# Patient Record
Sex: FEMALE | Race: WHITE | NOT HISPANIC OR LATINO | Employment: OTHER | URBAN - NONMETROPOLITAN AREA
[De-identification: names, ages, dates, MRNs, and addresses within clinical notes are randomized per-mention and may not be internally consistent; named-entity substitution may affect disease eponyms.]

---

## 2002-08-05 LAB — HIV 1+2 AB+HIV1 P24 AG SERPL QL IA: NEGATIVE

## 2017-02-16 ENCOUNTER — HISTORY (OUTPATIENT)
Dept: FAMILY MEDICINE | Facility: OTHER | Age: 62
End: 2017-02-16

## 2017-02-16 ENCOUNTER — OFFICE VISIT - GICH (OUTPATIENT)
Dept: FAMILY MEDICINE | Facility: OTHER | Age: 62
End: 2017-02-16

## 2017-02-16 DIAGNOSIS — J06.9 ACUTE UPPER RESPIRATORY INFECTION: ICD-10-CM

## 2017-03-15 ENCOUNTER — COMMUNICATION - GICH (OUTPATIENT)
Dept: FAMILY MEDICINE | Facility: OTHER | Age: 62
End: 2017-03-15

## 2017-03-15 DIAGNOSIS — J06.9 ACUTE UPPER RESPIRATORY INFECTION: ICD-10-CM

## 2017-05-01 ENCOUNTER — COMMUNICATION - GICH (OUTPATIENT)
Dept: FAMILY MEDICINE | Facility: OTHER | Age: 62
End: 2017-05-01

## 2017-05-01 DIAGNOSIS — G43.009 MIGRAINE WITHOUT AURA AND WITHOUT STATUS MIGRAINOSUS, NOT INTRACTABLE: ICD-10-CM

## 2017-05-15 ENCOUNTER — COMMUNICATION - GICH (OUTPATIENT)
Dept: FAMILY MEDICINE | Facility: OTHER | Age: 62
End: 2017-05-15

## 2017-05-15 DIAGNOSIS — J06.9 ACUTE UPPER RESPIRATORY INFECTION: ICD-10-CM

## 2017-07-10 ENCOUNTER — OFFICE VISIT - GICH (OUTPATIENT)
Dept: FAMILY MEDICINE | Facility: OTHER | Age: 62
End: 2017-07-10

## 2017-07-10 ENCOUNTER — HISTORY (OUTPATIENT)
Dept: FAMILY MEDICINE | Facility: OTHER | Age: 62
End: 2017-07-10

## 2017-07-10 DIAGNOSIS — N39.0 URINARY TRACT INFECTION: ICD-10-CM

## 2017-07-10 DIAGNOSIS — R82.998 OTHER ABNORMAL FINDINGS IN URINE: ICD-10-CM

## 2017-07-10 LAB
BACTERIA URINE: ABNORMAL BACTERIA/HPF
BILIRUB UR QL: ABNORMAL
CLARITY, URINE: CLEAR CLARITY
COLOR UR: YELLOW COLOR
EPITHELIAL CELLS: ABNORMAL EPI/HPF
GLUCOSE URINE: NEGATIVE MG/DL
KETONES UR QL: 15 MG/DL
LEUKOCYTE ESTERASE URINE: ABNORMAL
NITRITE UR QL STRIP: NEGATIVE
OCCULT BLOOD,URINE - HISTORICAL: ABNORMAL
PH UR: 5.5 [PH]
PROTEIN QUALITATIVE,URINE - HISTORICAL: NEGATIVE MG/DL
RBC - HISTORICAL: ABNORMAL /HPF
SP GR UR STRIP: 1.02
UROBILINOGEN,QUALITATIVE - HISTORICAL: NORMAL EU/DL
WBC - HISTORICAL: ABNORMAL /HPF

## 2017-07-10 ASSESSMENT — ANXIETY QUESTIONNAIRES
2. NOT BEING ABLE TO STOP OR CONTROL WORRYING: NEARLY EVERY DAY
GAD7 TOTAL SCORE: 20
1. FEELING NERVOUS, ANXIOUS, OR ON EDGE: NEARLY EVERY DAY
3. WORRYING TOO MUCH ABOUT DIFFERENT THINGS: NEARLY EVERY DAY
6. BECOMING EASILY ANNOYED OR IRRITABLE: MORE THAN HALF THE DAYS
4. TROUBLE RELAXING: NEARLY EVERY DAY
5. BEING SO RESTLESS THAT IT IS HARD TO SIT STILL: NEARLY EVERY DAY
7. FEELING AFRAID AS IF SOMETHING AWFUL MIGHT HAPPEN: NEARLY EVERY DAY

## 2017-07-10 ASSESSMENT — PATIENT HEALTH QUESTIONNAIRE - PHQ9: SUM OF ALL RESPONSES TO PHQ QUESTIONS 1-9: 18

## 2017-07-11 ENCOUNTER — HISTORY (OUTPATIENT)
Dept: EMERGENCY MEDICINE | Facility: OTHER | Age: 62
End: 2017-07-11

## 2017-07-12 LAB
CULTURE - HISTORICAL: ABNORMAL
CULTURE - HISTORICAL: ABNORMAL
SUSCEPTIBILITY RESULT - HISTORICAL: ABNORMAL

## 2017-07-25 ENCOUNTER — COMMUNICATION - GICH (OUTPATIENT)
Dept: FAMILY MEDICINE | Facility: OTHER | Age: 62
End: 2017-07-25

## 2017-07-27 ENCOUNTER — HISTORY (OUTPATIENT)
Dept: FAMILY MEDICINE | Facility: OTHER | Age: 62
End: 2017-07-27

## 2017-07-27 ENCOUNTER — OFFICE VISIT - GICH (OUTPATIENT)
Dept: FAMILY MEDICINE | Facility: OTHER | Age: 62
End: 2017-07-27

## 2017-07-27 DIAGNOSIS — R31.29 OTHER MICROSCOPIC HEMATURIA: ICD-10-CM

## 2017-07-27 DIAGNOSIS — N30.00 ACUTE CYSTITIS WITHOUT HEMATURIA: ICD-10-CM

## 2017-07-27 DIAGNOSIS — R30.0 DYSURIA: ICD-10-CM

## 2017-07-27 LAB
BACTERIA URINE: ABNORMAL BACTERIA/HPF
BILIRUB UR QL: ABNORMAL
CLARITY, URINE: CLEAR CLARITY
COLOR UR: YELLOW COLOR
EPITHELIAL CELLS: ABNORMAL EPI/HPF
GLUCOSE URINE: NEGATIVE MG/DL
KETONES UR QL: ABNORMAL MG/DL
LEUKOCYTE ESTERASE URINE: ABNORMAL
NITRITE UR QL STRIP: NEGATIVE
OCCULT BLOOD,URINE - HISTORICAL: NEGATIVE
OTHER: ABNORMAL
PH UR: 6.5 [PH]
PROTEIN QUALITATIVE,URINE - HISTORICAL: NEGATIVE MG/DL
RBC - HISTORICAL: ABNORMAL /HPF
SP GR UR STRIP: 1.01
UROBILINOGEN,QUALITATIVE - HISTORICAL: NORMAL EU/DL
WBC - HISTORICAL: ABNORMAL /HPF

## 2017-07-28 LAB — CULTURE - HISTORICAL: NORMAL

## 2017-08-08 ENCOUNTER — OFFICE VISIT (OUTPATIENT)
Dept: UROLOGY | Facility: OTHER | Age: 62
End: 2017-08-08
Attending: NURSE PRACTITIONER
Payer: MEDICARE

## 2017-08-08 VITALS
BODY MASS INDEX: 26.16 KG/M2 | HEIGHT: 65 IN | SYSTOLIC BLOOD PRESSURE: 110 MMHG | DIASTOLIC BLOOD PRESSURE: 60 MMHG | HEART RATE: 56 BPM | TEMPERATURE: 97.9 F | WEIGHT: 157 LBS

## 2017-08-08 DIAGNOSIS — R82.90 ABNORMAL URINE FINDINGS: ICD-10-CM

## 2017-08-08 DIAGNOSIS — R82.998 DARK URINE: ICD-10-CM

## 2017-08-08 DIAGNOSIS — N36.44 DETRUSOR SPHINCTER DYSSYNERGIA: Primary | ICD-10-CM

## 2017-08-08 DIAGNOSIS — R82.90 BAD ODOR OF URINE: ICD-10-CM

## 2017-08-08 LAB
ALBUMIN UR-MCNC: NEGATIVE MG/DL
APPEARANCE UR: ABNORMAL
BACTERIA #/AREA URNS HPF: ABNORMAL /HPF
BILIRUB UR QL STRIP: NEGATIVE
CAOX CRY #/AREA URNS HPF: ABNORMAL /HPF
COLOR UR AUTO: YELLOW
GLUCOSE UR STRIP-MCNC: NEGATIVE MG/DL
HGB UR QL STRIP: NEGATIVE
KETONES UR STRIP-MCNC: 10 MG/DL
LEUKOCYTE ESTERASE UR QL STRIP: ABNORMAL
MUCOUS THREADS #/AREA URNS LPF: PRESENT /LPF
NITRATE UR QL: NEGATIVE
PH UR STRIP: 6 PH (ref 4.7–8)
RBC #/AREA URNS AUTO: 4 /HPF (ref 0–2)
SP GR UR STRIP: 1.01 (ref 1–1.03)
URN SPEC COLLECT METH UR: ABNORMAL
UROBILINOGEN UR STRIP-MCNC: 2 MG/DL (ref 0–2)
WBC #/AREA URNS AUTO: 5 /HPF (ref 0–2)

## 2017-08-08 PROCEDURE — 84999 UNLISTED CHEMISTRY PROCEDURE: CPT | Mod: ZL | Performed by: NURSE PRACTITIONER

## 2017-08-08 PROCEDURE — 87109 MYCOPLASMA: CPT | Mod: ZL | Performed by: NURSE PRACTITIONER

## 2017-08-08 PROCEDURE — 99213 OFFICE O/P EST LOW 20 MIN: CPT | Performed by: NURSE PRACTITIONER

## 2017-08-08 PROCEDURE — 51798 US URINE CAPACITY MEASURE: CPT

## 2017-08-08 PROCEDURE — 87086 URINE CULTURE/COLONY COUNT: CPT | Mod: ZL | Performed by: NURSE PRACTITIONER

## 2017-08-08 PROCEDURE — 81001 URINALYSIS AUTO W/SCOPE: CPT | Mod: ZL | Performed by: NURSE PRACTITIONER

## 2017-08-08 PROCEDURE — 99213 OFFICE O/P EST LOW 20 MIN: CPT

## 2017-08-08 RX ORDER — DOXAZOSIN 1 MG/1
TABLET ORAL
Qty: 90 TABLET | Refills: 3 | Status: SHIPPED | OUTPATIENT
Start: 2017-08-08 | End: 2018-07-30

## 2017-08-08 ASSESSMENT — PAIN SCALES - GENERAL: PAINLEVEL: NO PAIN (0)

## 2017-08-08 NOTE — NURSING NOTE
"Chief Complaint   Patient presents with     UTI     Dark urine with odor for past        Initial /60 (BP Location: Right arm, Patient Position: Chair, Cuff Size: Adult Regular)  Pulse 56  Temp 97.9  F (36.6  C) (Tympanic)  Ht 1.638 m (5' 4.5\")  Wt 71.2 kg (157 lb)  BMI 26.53 kg/m2 Estimated body mass index is 26.53 kg/(m^2) as calculated from the following:    Height as of this encounter: 1.638 m (5' 4.5\").    Weight as of this encounter: 71.2 kg (157 lb).  Medication Reconciliation: complete   BEREKET ROSAS      "

## 2017-08-08 NOTE — MR AVS SNAPSHOT
"              After Visit Summary   8/8/2017    Demetra Chang    MRN: [id number]           Patient Information     Date Of Birth          1955        Visit Information        Provider Department      8/8/2017 2:00 PM Jessica Dey NP [facility]        Today's Diagnoses     Detrusor sphincter dyssynergia    -  1    Bad odor of urine        Dark urine          Care Instructions    I am sending your urine for several tests, a urinalysis, and for ureaplasma and mycoplasma.  We want to make sure we are testing for everything.  You may cancel your appt for next month and see me again in a year.  I reordered your Cardura.            Follow-ups after your visit        Your next 10 appointments already scheduled     Aug 07, 2018  1:00 PM CDT   (Arrive by 12:45 PM)   Return Visit with Jessica Dey NP   [facility] ([facility] )    [address]  [facility] [address]   [phone]              Who to contact     If you have questions or need follow up information about today's clinic visit or your schedule please contact [facility] directly at [phone].  Normal or non-critical lab and imaging results will be communicated to you by MyChart, letter or phone within 4 business days after the clinic has received the results. If you do not hear from us within 7 days, please contact the clinic through MyChart or phone. If you have a critical or abnormal lab result, we will notify you by phone as soon as possible.  Submit refill requests through [employer] or call your pharmacy and they will forward the refill request to us. Please allow 3 business days for your refill to be completed.          Additional Information About Your Visit        MyChart Information     [employer] lets you send messages to your doctor, view your test results, renew your prescriptions, schedule appointments and more. To sign up, go to [email or web address] . Click on \"Log in\" on the " "left side of the screen, which will take you to the Welcome page. Then click on \"Sign up Now\" on the right side of the page.     You will be asked to enter the access code listed below, as well as some personal information. Please follow the directions to create your username and password.     Your access code is: [email or web address]  Expires: 2017  2:51 PM     Your access code will  in 90 days. If you need help or a new code, please call your [facility] clinic or [phone].        Care EveryWhere ID     This is your Care EveryWhere ID. This could be used by other organizations to access your [facility] medical records  [id number]        Your Vitals Were     Pulse Temperature Height BMI (Body Mass Index)          56 97.9  F (36.6  C) (Tympanic) 1.638 m (5' 4.5\") 26.53 kg/m2         Blood Pressure from Last 3 Encounters:   17 110/60   16 112/64   09/01/15 100/62    Weight from Last 3 Encounters:   17 71.2 kg (157 lb)   16 81.6 kg (180 lb)   09/01/15 77.1 kg (170 lb)              We Performed the Following     Mycoplasma large colony culture     UA reflex to Microscopic and Culture     Ureaplasma culture          Where to get your medicines      These medications were sent to [id number] - [place], [place] - [address]  [address], [facility] [address]     Phone:  [phone]     doxazosin 1 MG tablet          Primary Care Provider Office Phone # Fax #    Ashley Levy [phone]       [facility] [address]  [place] [address]        Equal Access to Services     FARAZ PLASENCIA : Herbert Krueger, antonia prasad, jeny waller. So [facility] [phone].    ATENCIÓN: Si habla español, tiene a handy disposición servicios gratuitos de asistencia lingüística. Llame al [phone].    We comply with applicable federal civil rights laws and [place] laws. " We do not discriminate on the basis of race, color, national origin, age, disability sex, sexual orientation or gender identity.            Thank you!     Thank you for choosing [facility]  for your care. Our goal is always to provide you with excellent care. Hearing back from our patients is one way we can continue to improve our services. Please take a few minutes to complete the written survey that you may receive in the mail after your visit with us. Thank you!             Your Updated Medication List - Protect others around you: Learn how to safely use, store and throw away your medicines at www.disposemymeds.org.          This list is accurate as of: 8/8/17  2:51 PM.  Always use your most recent med list.                   Brand Name Dispense Instructions for use Diagnosis    ABILIFY 15 MG tablet   Generic drug:  ARIPiprazole      Take 15 mg by mouth daily        ADDERALL XR PO      Take 30 mg by mouth 2 times daily        aspirin 81 MG tablet      Take 81 mg by mouth daily        CALCIUM 600 PO      Take 500 mg by mouth 2 times daily        cyanocobalamin 1000 MCG tablet    vitamin  B-12     Take 1,000 mcg by mouth daily        doxazosin 1 MG tablet    CARDURA    90 tablet    TAKE 1 TABLET BY MOUTH EVERY NIGHT AT BEDTIME.    Detrusor sphincter dyssynergia       EFFEXOR XR PO      Take 75 mg by mouth 3 times daily        FLUoxetine 40 MG capsule    PROzac     Take 40 mg by mouth 2 times daily        klonoPIN 1 MG tablet   Generic drug:  clonazePAM      Take 0.5 mg by mouth nightly as needed        MULTI-VITAMIN PO      Take by mouth daily        TAPAZOLE 5 MG tablet   Generic drug:  methimazole      Take 5 mg by mouth daily        TOPAMAX 100 MG tablet   Generic drug:  topiramate      Take 100 mg by mouth 3 times daily        VITAMIN D PO      Take 2,000 Int'l Units by mouth daily

## 2017-08-08 NOTE — PATIENT INSTRUCTIONS
I am sending your urine for several tests, a urinalysis, and for ureaplasma and mycoplasma.  We want to make sure we are testing for everything.  You may cancel your appt for next month and see me again in a year.  I reordered your Cardura.

## 2017-08-08 NOTE — PROGRESS NOTES
CC: Follow up for DSD and feelings of a urinary tract infection.    HPI: Ms. Demetra Chang is a very pleasant 59 year old female seen today in the urology clinic for follow up regarding detrusor sphincter dyssynergia (DSD).  She also feels like she has a urinary tract infection. The symptoms she is experiencing includes a bad odor and very dark urine. This is abnormal for her because she drinks a lot of water during the day.   She tells me that she has seen Dr. Ashley in [place] twice in the past few months and has been treated for a urinary tract infection.  She was given nitrofurantoin once and Bactrim once.   She has been on Cardura for several years for this DSD.  This has been working well for her.  (Her original symptoms were an inability to void)  She is now taking her Cardura in the morning and no longer has any issues with leaking or not making it to the   She denies issues with  hematuria, dysuria, frequency, urgency or nocturia.     Review Of Systems  Skin: negative  Eyes: negative  Ears/Nose/Throat: negative  Respiratory: No shortness of breath, dyspnea on exertion, cough, or hemoptysis  Cardiovascular: negative  Gastrointestinal: negative  Genitourinary: negative  Musculoskeletal: has had some issues with muscle pain recently  Neurologic: negative  Psychiatric: depression stable  Hematologic/Lymphatic/Immunologic: negative  Endocrine: negative    No past medical history on file.    Past Surgical History:   Procedure Laterality Date     ORIF ankle  1999    RT      SINUS SURGERY       SURGICAL PATHOLOGY EXAM      polyp removal       Current Outpatient Prescriptions   Medication     aspirin 81 MG tablet     cyanocobalamin (VITAMIN  B-12) 1000 MCG tablet     doxazosin (CARDURA) 1 MG tablet     Venlafaxine HCl (EFFEXOR XR PO)     FLUoxetine (PROZAC) 40 MG capsule     Amphetamine-Dextroamphetamine (ADDERALL XR PO)     topiramate (TOPAMAX) 100 MG tablet     ARIPiprazole (ABILIFY) 15 MG tablet      "clonazePAM (KLONOPIN) 1 MG tablet     methimazole (TAPAZOLE) 5 MG tablet     Multiple Vitamin (MULTI-VITAMIN PO)     Calcium Carbonate (CALCIUM 600 PO)     Cholecalciferol (VITAMIN D PO)     No current facility-administered medications for this visit.        No Known Allergies    Social History     Social History     Marital status:      Spouse name: N/A     Number of children: N/A     Years of education: N/A     Occupational History     Not on file.     Social History Main Topics     Smoking status: Never Smoker     Smokeless tobacco: Never Used     Alcohol use No     Drug use: No     Sexual activity: Not on file     Other Topics Concern     Parent/Sibling W/ Cabg, Mi Or Angioplasty Before 65f 55m? No     Social History Narrative       Family History   Problem Relation Age of Onset     CANCER Mother      lung (cause of death)      Prostate Cancer Father      (cause of death)      PHYSICAL EXAM:   /60 (BP Location: Right arm, Patient Position: Chair, Cuff Size: Adult Regular)  Pulse 56  Temp 97.9  F (36.6  C) (Tympanic)  Ht 1.638 m (5' 4.5\")  Wt 71.2 kg (157 lb)  BMI 26.53 kg/m2  GENERAL: Well groomed, well developed, well nourished female in no acute distress.  RESP: No increased respiratory effort. Lungs clear to auscultation bilateral.  CV: RRR, no murmurs/rubs/gallops.  LYMPH: No LE edema.  ABD: Soft, non tender, non distended, no palpable masses.  Normoactive bowel sounds.  No CVA tenderness bilaterally.  MS: Full ROM in extremities.  NEURO: Alert and oriented x 3.  PSYCH: Normal mood and affect, pleasant and agreeable during interview and exam.    PVR: Postvoid residual urine volume as measured by ultrasound was 48 ml.      ASSESSMENT/PLAN:  Ms. Demetra Chang is a very pleasant 59 year old female with a history of DSD and now feels like she has a urinary tract infection.  She is currently doing well with her Cardura for her DSD and will continue to take it and see me in a year.  We will get " her records from Dr. Ashley regarding her urine samples.  I will run a repeat urinalysis today, as well as a ureaplasma and mycoplasma test.  She may need to return sooner for this issue if her symptoms do not improve.    I have enjoyed participating in the medical care of this very pleasant patient.  Using layterms, and diagrams when needed, I explained the pathophysiology, usual course, potential complications, recommended monitoring, preventive modalities, treatment rationale, risks, and benefits to Ms. Demetra Chang. The patient appeared to understand and all questions were satisfactorily answered.      Jessica Dey CNP, [facility]  Urology and Wound Care  August 8, 2017

## 2017-08-10 LAB
BACTERIA SPEC CULT: ABNORMAL
BACTERIA SPEC CULT: NORMAL
BACTERIA SPEC CULT: NORMAL
MICRO REPORT STATUS: ABNORMAL
MICRO REPORT STATUS: NORMAL
MICRO REPORT STATUS: NORMAL
SPECIMEN SOURCE: ABNORMAL
SPECIMEN SOURCE: NORMAL
SPECIMEN SOURCE: NORMAL

## 2017-08-15 LAB
RESULT: NORMAL
SEND OUTS MISC TEST CODE: NORMAL
SEND OUTS MISC TEST SPECIMEN: NORMAL
TEST NAME: NORMAL

## 2017-12-27 NOTE — PROGRESS NOTES
Patient Information     Patient Name MRN Demetra Carpenter [id number] Female 1955      Progress Notes by Barron Ashley MD at 2017 10:30 AM     Author:  Barron Ashley MD Service:  (none) Author Type:  Physician     Filed:  2017 11:27 AM Encounter Date:  2017 Status:  Signed     :  Barron Ashley MD (Physician)            SUBJECTIVE:  Demetra Chang is a 61 y.o. female who presents for follow-up of a UTI. She was seen on July 10 with complaints of dark-appearing odiferous urine. Urine culture grew Escherichia coli which was pansensitive and she completed a seven-day course of Macrodantin. She didn't really notice much difference with antibody treatment. Her urine still has an odor and appears dark yellow in color. She denies urinary frequency or dysuria.    No Known Allergies and   Current Outpatient Prescriptions on File Prior to Visit       Medication  Sig Dispense Refill     ARIPiprazole (ABILIFY) 10 mg tablet Take 10 mg by mouth.       aspirin (ECOTRIN) 81 mg enteric coated tablet Take 1 tablet by mouth once daily with a meal.       cholecalciferol (VITAMIN D) 1,000 unit capsule Take 1,000 Units by mouth once daily.       clonazePAM (KLONOPIN) 0.5 mg tablet Take 1 tablet by mouth at bedtime. 60 tablet 3     doxazosin (CARDURA) 1 mg tablet Take 1 mg by mouth once daily.       FLUoxetine (PROZAC) 40 mg capsule TAKE 1 CAPSULE BY MOUTH TWICE DAILY. 62 capsule 10     methimazole (TAPAZOLE) 5 mg tablet Take 5 mg by mouth once daily.       multivitamin (MVI) tablet Take 1 tablet by mouth once daily.       topiramate (TOPAMAX) 100 mg tablet Take 1 tablet by mouth 3 times daily. 270 tablet 0     venlafaxine (EFFEXOR XR) 75 mg cp24 Extended-Release capsule TAKE 3 CAPSULES BY MOUTH ONCE DAILY WITH A MEAL 270 capsule 3     No current facility-administered medications on file prior to visit.        OBJECTIVE:  /72  Pulse 88  Temp 98  F (36.7  C) (Temporal)  Ht 1.638 m  "(5' 4.5\")  Wt 73 kg (161 lb)  LMP 12/19/2016  BMI 27.21 kg/m2  EXAM:  General Appearance: Pleasant, alert, appropriate appearance for age. No acute distress.    Results for orders placed or performed in visit on 07/27/17      URINALYSIS W REFLEX MICROSCOPIC IF POSITIVE      Result  Value Ref Range    COLOR                     Yellow Yellow Color    CLARITY                   Clear Clear Clarity    SPECIFIC GRAVITY,URINE    1.015 1.010, 1.015, 1.020, 1.025                    PH,URINE                  6.5 6.0, 7.0, 8.0, 5.5, 6.5, 7.5, 8.5                    UROBILINOGEN,QUALITATIVE  Normal Normal EU/dl    PROTEIN, URINE Negative Negative mg/dL    GLUCOSE, URINE Negative Negative mg/dL    KETONES,URINE             Trace (A) Negative mg/dL    BILIRUBIN,URINE           Abnormal (A) Negative                    OCCULT BLOOD,URINE        Negative Negative                    NITRITE                   Negative Negative                    LEUKOCYTE ESTERASE        Moderate (A) Negative                   URINALYSIS MICROSCOPIC      Result  Value Ref Range    RBC 11-25 (A) 0-2, None Seen /HPF    WBC None Seen 0-2, 3-5, None Seen /HPF    BACTERIA                  Few None Seen, Rare, Occasional, Few Bacteria/HPF    EPITHELIAL CELLS          None Seen None Seen, Few Epi/HPF    OTHER Mucus Present        ASSESSMENT/Plan :      ICD-10-CM    1. Acute cystitis without hematuria  Elected to treat with a seven-day course of Bactrim DS pending urine culture results. Will repeat a urine culture in 2 weeks to document clearing of infection.  N30.00 trimethoprim-sulfamethoxazole, 160-800 mg, (BACTRIM DS, SEPTRA DS) tablet      URINALYSIS W REFLEX MICROSCOPIC IF POSITIVE      URINE CULTURE   2. Microscopic hematuria  Possibly related to #1 above. Will repeat a urinalysis 2 weeks from now after she has completed Bactrim DS.  R31.29 URINALYSIS W REFLEX MICROSCOPIC IF POSITIVE      URINE CULTURE   3. Dysuria R30.0 URINALYSIS W REFLEX " MICROSCOPIC IF POSITIVE      URINALYSIS W REFLEX MICROSCOPIC IF POSITIVE      URINALYSIS MICROSCOPIC      URINALYSIS MICROSCOPIC      URINE CULTURE      URINE CULTURE     Patient Instructions   Schedule a lab only appointment in two weeks for a repeat urinalysis and culture.       Barron Ashley MD

## 2017-12-28 NOTE — PATIENT INSTRUCTIONS
Patient Information     Patient Name MRN Demetra Carpenter [id number] Female 1955      Patient Instructions by Barron Ashley MD at 2017 10:30 AM     Author:  Barron Ashley MD Service:  (none) Author Type:  Physician     Filed:  2017 11:11 AM Encounter Date:  2017 Status:  Signed     :  Barron Ashley MD (Physician)            Schedule a lab only appointment in two weeks for a repeat urinalysis and culture.

## 2017-12-28 NOTE — PROGRESS NOTES
"Patient Information     Patient Name MRN Demetra Carpenter [id number] Female 1955      Progress Notes by Barron Ashley MD at 7/10/2017 11:00 AM     Author:  Barron Ashley MD Service:  (none) Author Type:  Physician     Filed:  7/10/2017 11:58 AM Encounter Date:  7/10/2017 Status:  Signed     :  Barron Ashley MD (Physician)            SUBJECTIVE:  Demetra Chang is a 61 y.o. female who presents concerned about a possible bladder infection. Over the past month or so has noted that her urine seems darker than usual and has a strong odor. She denies urinary frequency or dysuria. No new foods although has been trying to follow a reduced carbohydrate diet recently.    No Known Allergies and   Current Outpatient Prescriptions on File Prior to Visit       Medication  Sig Dispense Refill     Amphetamine-Dextroamphetamine (ADDERALL) 30 mg tablet Take 1 tablet by mouth 2 times daily  Prescribed by Harika       aspirin (ECOTRIN) 81 mg enteric coated tablet Take 1 tablet by mouth once daily with a meal.       cholecalciferol (VITAMIN D) 1,000 unit capsule Take 1,000 Units by mouth once daily.       clonazePAM (KLONOPIN) 0.5 mg tablet Take 1 tablet by mouth at bedtime. 60 tablet 3     doxazosin (CARDURA) 1 mg tablet Take 1 mg by mouth once daily.       FLUoxetine (PROZAC) 40 mg capsule TAKE 1 CAPSULE BY MOUTH TWICE DAILY. 62 capsule 10     methimazole (TAPAZOLE) 5 mg tablet Take 5 mg by mouth once daily.       multivitamin (MVI) tablet Take 1 tablet by mouth once daily.       topiramate (TOPAMAX) 100 mg tablet Take 1 tablet by mouth 3 times daily. 270 tablet 0     venlafaxine (EFFEXOR XR) 75 mg cp24 Extended-Release capsule TAKE 3 CAPSULES BY MOUTH ONCE DAILY WITH A MEAL 270 capsule 3     No current facility-administered medications on file prior to visit.        OBJECTIVE:  /74  Temp 97.1  F (36.2  C) (Temporal)  Ht 1.638 m (5' 4.5\")  Wt 75.8 kg (167 lb)  LMP 2016  BMI 28.22 " kg/m2  EXAM:  General Appearance: Pleasant, alert, appropriate appearance for age. No acute distress  Gastrointestinal Exam: Soft, nontender, no abnormal masses or organomegaly.  Genitourinary Exam Female: No CVA tenderness.    Results for orders placed or performed in visit on 07/10/17      URINALYSIS W REFLEX MICROSCOPIC IF POSITIVE      Result  Value Ref Range    COLOR                     Yellow Yellow Color    CLARITY                   Clear Clear Clarity    SPECIFIC GRAVITY,URINE    1.025 1.010, 1.015, 1.020, 1.025                    PH,URINE                  5.5 6.0, 7.0, 8.0, 5.5, 6.5, 7.5, 8.5                    UROBILINOGEN,QUALITATIVE  Normal Normal EU/dl    PROTEIN, URINE Negative Negative mg/dL    GLUCOSE, URINE Negative Negative mg/dL    KETONES,URINE             15 (A) Negative mg/dL    BILIRUBIN,URINE           Abnormal (A) Negative                    OCCULT BLOOD,URINE        Trace (A) Negative                    NITRITE                   Negative Negative                    LEUKOCYTE ESTERASE        Large (A) Negative                   URINALYSIS MICROSCOPIC      Result  Value Ref Range    RBC None Seen 0-2, None Seen /HPF    WBC  (A) 0-2, 3-5, None Seen /HPF    BACTERIA                  Many (A) None Seen, Rare, Occasional, Few Bacteria/HPF    EPITHELIAL CELLS          None Seen None Seen, Few Epi/HPF       ASSESSMENT/Plan :      ICD-10-CM    1. Urinary tract infection, site unspecified  Elected to treat with a seven-day course of Macrobid pending urine culture results. She will continue to push fluids and follow up if symptoms do not resolve.  N39.0 URINE CULTURE      nitrofurantoin macrocrystals/monohydrate (MACROBID) 100 mg capsule      URINE CULTURE   2. Dark urine R82.99 URINALYSIS W REFLEX MICROSCOPIC IF POSITIVE      URINALYSIS W REFLEX MICROSCOPIC IF POSITIVE      URINALYSIS MICROSCOPIC      URINALYSIS MICROSCOPIC       Barron Ashley MD

## 2017-12-30 NOTE — NURSING NOTE
Patient Information     Patient Name MRN Sex Demetra Currie [id number] Female 1955      Nursing Note by Gosselin, Norma J at 2017 10:30 AM     Author:  Gosselin, Norma J Service:  (none) Author Type:  (none)     Filed:  2017 10:52 AM Encounter Date:  2017 Status:  Signed     :  Gosselin, Norma J            Patient presents to clinic for follow up UTI.  Norma J Gosselin LPN....................  2017   10:45 AM

## 2018-01-03 NOTE — TELEPHONE ENCOUNTER
Patient Information     Patient Name MRN Sex Demetra Currie [id number] Female 1955      Telephone Encounter by Justin Lai RN at 3/15/2017  3:11 PM     Author:  Justin Lai RN Service:  (none) Author Type:  NURS- Registered Nurse     Filed:  3/15/2017  3:16 PM Encounter Date:  3/15/2017 Status:  Signed     :  Justin Lai RN (NURS- Registered Nurse)            This is a Refill request from: [facility]  Name of Medication: Flonase  Quantity requested: 1 bottle  Last fill date: 17  Due for refill: Unknown  Last visit with Miles Alvarez MD was on: 16 in [facility]ated [facility]  PCP:  Miles Alvarez MD  Controlled Substance Agreement:  N/A   Diagnosis r/t this medication request: URI    Chart review shows that requested rx was ordered on 17 by Dr. Gibbons. Unknown if patient is to continue utilizing as was ordered for an acute issue of an URI. Will pend rx request to PCP for his consideration.     Unable to complete prescription refill per RN Medication Refill Policy.................... Justin Lai RN ....................  3/15/2017   3:13 PM

## 2018-01-03 NOTE — PROGRESS NOTES
Patient Information     Patient Name MRN Sex Demetra Currie [id number] Female 1955      Progress Notes by Mya Gibbons MD at 2017  2:00 PM     Author:  Mya Gibbons MD Service:  (none) Author Type:  Physician     Filed:  2017  4:28 PM Encounter Date:  2017 Status:  Signed     :  Mya Gibbons MD (Physician)            Subjective    Demetra Chang is a 61 y.o. female who complains of a cough.  Duration: 3 weeks  Severity: mild  Modifiers: Rx meds helpful  Trend of symptoms: steady for several days  Fever: none  Cough is dry  Other symptoms include: sore throat  History of: no significant respiratory history  History of same illness: no prior history of similar illness  Ill contacts: no contacts known with similar symptoms      Objective    Visit Vitals       /78     Pulse 98     Temp 97.7  F (36.5  C) (Oral)     Wt 88.4 kg (194 lb 12.8 oz)     LMP 2016     BMI 32.82 kg/m2     General appearance: healthy, alert and cooperative.    Left Ear: normal, normal; external ear canal and TM clear, nontender.  Right Ear: normal; external ear canal and TM clear, nontender.  Nose: normal mucosa  Sinuses: normal; sinuses nontender  Oropharynx: normal pharynx and buccal mucosa. Dental hygeine adequate.  Neck: normal; supple with no masses or nodes.  Lungs: normal chest wall and respirations; clear to auscultation.  Heart: normal; regular rate and rhythm.  S1, S2, no murmur, click, gallop, or rubs.        Assessment      ICD-10-CM    1. Upper respiratory tract infection, unspecified type J06.9 fluticasone (50 mcg per actuation) nasal solution (FLONASE)      guaiFENesin (MUCINEX) 600 mg Extended-Release tablet           Plan    Meds as per orders.  Symptomatic therapy suggested: use increased fluids and rest and acetaminophen prn.  Call or return to clinic prn if these symptoms worsen or fail to improve as anticipated.  No indication for antibiotics,    Mya Rico MD  4:28 PM 2/17/2017

## 2018-01-03 NOTE — NURSING NOTE
Patient Information     Patient Name MRN Demetra Carpenter [id number] Female 1955      Nursing Note by Lala Galeas at 2017  2:00 PM     Author:  Lala Galeas Service:  (none) Author Type:  (none)     Filed:  2017  2:52 PM Encounter Date:  2017 Status:  Signed     :  Lala Galeas            Patient is here for cough and not feeling well since November, states has been seen x 2. Having nasal congestion, headache, cough, some chest congestion, chills, no energy and very tired all the time.  Lala Galeas LPN .............2017  2:25 PM

## 2018-01-04 NOTE — TELEPHONE ENCOUNTER
Patient Information     Patient Name MRN Demetra Carpenter [id number] Female 1955      Telephone Encounter by Lorna Wakefield RN at 2017  1:43 PM     Author:  Lorna Wakefield RN Service:  (none) Author Type:  NURS- Registered Nurse     Filed:  2017  1:46 PM Encounter Date:  2017 Status:  Signed     :  Lorna Wakefield RN (NURS- Registered Nurse)            Triptans-Serotonin Receptor Agonist    Office visit in the past 12 months or per provider note.    Last visit with PRABHJOT VELAZCO was on: 2016 in [facility]  Next visit with PRABHJOT VELAZCO is on: No future appointment listed with this provider  Next visit with Family Practice is on: No future appointment listed in this department    Max refill for 12 months from last office visit or per provider note.    Patient is due for medication management appointment. Limited refill provided at this time and letter sent for reminder to patient. Prescription refilled per RN Medication Refill Policy.................... Lorna Wakefield RN ....................  2017   1:45 PM

## 2018-01-05 NOTE — TELEPHONE ENCOUNTER
Patient Information     Patient Name MRN Demetra Carpenter [id number] Female 1955      Telephone Encounter by Lorna Wakefield RN at 5/15/2017  2:59 PM     Author:  Lorna Wakefield RN Service:  (none) Author Type:  NURS- Registered Nurse     Filed:  5/15/2017  3:03 PM Encounter Date:  5/15/2017 Status:  Signed     :  Lorna Wakefield RN (NURS- Registered Nurse)             Nasal Steroids    Office visit in the past 12 months.    Last visit with PRABHJOT VELAZCO was on: 2016 in [facility]  Next visit with PRABHJOT VELAZCO is on: No future appointment listed with this provider  Next visit with Family Practice is on: No future appointment listed in this department    Max refills 12 months from last office visit.    Patient is due for medication management appointment. Limited refill provided at this time. [employer] message and/or letter sent for reminder to patient. Prescription refilled per RN Medication Refill Policy.................... Lorna Wakefield RN ....................  5/15/2017   3:00 PM

## 2018-01-25 VITALS
TEMPERATURE: 97.7 F | HEART RATE: 98 BPM | SYSTOLIC BLOOD PRESSURE: 120 MMHG | DIASTOLIC BLOOD PRESSURE: 78 MMHG | BODY MASS INDEX: 32.82 KG/M2 | WEIGHT: 194.8 LBS

## 2018-01-25 VITALS
TEMPERATURE: 97.1 F | BODY MASS INDEX: 27.82 KG/M2 | WEIGHT: 167 LBS | HEIGHT: 65 IN | DIASTOLIC BLOOD PRESSURE: 74 MMHG | SYSTOLIC BLOOD PRESSURE: 126 MMHG

## 2018-01-25 VITALS
WEIGHT: 161 LBS | BODY MASS INDEX: 26.82 KG/M2 | SYSTOLIC BLOOD PRESSURE: 112 MMHG | DIASTOLIC BLOOD PRESSURE: 72 MMHG | HEIGHT: 65 IN | TEMPERATURE: 98 F | HEART RATE: 88 BPM

## 2018-01-27 ASSESSMENT — PATIENT HEALTH QUESTIONNAIRE - PHQ9: SUM OF ALL RESPONSES TO PHQ QUESTIONS 1-9: 18

## 2018-01-27 ASSESSMENT — ANXIETY QUESTIONNAIRES: GAD7 TOTAL SCORE: 20

## 2018-03-09 ENCOUNTER — DOCUMENTATION ONLY (OUTPATIENT)
Dept: FAMILY MEDICINE | Facility: OTHER | Age: 63
End: 2018-03-09

## 2018-03-09 PROBLEM — Z86.59 PERSONAL HISTORY OF OTHER MENTAL DISORDER: Status: ACTIVE | Noted: 2018-03-09

## 2018-03-09 RX ORDER — DEXTROAMPHETAMINE SACCHARATE, AMPHETAMINE ASPARTATE MONOHYDRATE, DEXTROAMPHETAMINE SULFATE AND AMPHETAMINE SULFATE 7.5; 7.5; 7.5; 7.5 MG/1; MG/1; MG/1; MG/1
1 CAPSULE, EXTENDED RELEASE ORAL 2 TIMES DAILY
COMMUNITY
Start: 2017-07-24 | End: 2024-05-02 | Stop reason: DRUGHIGH

## 2018-03-09 RX ORDER — ASPIRIN 81 MG/1
81 TABLET ORAL
COMMUNITY
Start: 2015-04-20 | End: 2018-11-13

## 2018-03-09 RX ORDER — ARIPIPRAZOLE 10 MG/1
10 TABLET ORAL
COMMUNITY
Start: 2016-10-05

## 2018-03-09 RX ORDER — VENLAFAXINE HYDROCHLORIDE 75 MG/1
225 CAPSULE, EXTENDED RELEASE ORAL
COMMUNITY
Start: 2016-06-17 | End: 2018-11-13

## 2018-03-09 RX ORDER — DIPHENOXYLATE HYDROCHLORIDE AND ATROPINE SULFATE 2.5; .025 MG/1; MG/1
1 TABLET ORAL DAILY
COMMUNITY
End: 2018-11-13

## 2018-03-09 RX ORDER — DOXAZOSIN 1 MG/1
1 TABLET ORAL DAILY
COMMUNITY
End: 2018-11-13

## 2018-03-09 RX ORDER — CLONAZEPAM 1 MG/1
0.5 TABLET ORAL
COMMUNITY
End: 2018-11-13

## 2018-03-09 RX ORDER — CLONAZEPAM 0.5 MG/1
0.5 TABLET ORAL AT BEDTIME
COMMUNITY
Start: 2016-12-19 | End: 2018-11-13

## 2018-03-09 RX ORDER — TOPIRAMATE 100 MG/1
100 TABLET, FILM COATED ORAL 3 TIMES DAILY
COMMUNITY
Start: 2017-05-01 | End: 2018-11-13

## 2018-03-09 RX ORDER — FLUOXETINE 40 MG/1
CAPSULE ORAL
COMMUNITY
Start: 2016-03-25 | End: 2018-11-13

## 2018-03-09 RX ORDER — METHIMAZOLE 5 MG/1
5 TABLET ORAL DAILY
COMMUNITY
End: 2018-11-13

## 2018-03-09 RX ORDER — SULFAMETHOXAZOLE/TRIMETHOPRIM 800-160 MG
1 TABLET ORAL 2 TIMES DAILY
COMMUNITY
Start: 2017-07-27 | End: 2018-11-13

## 2018-07-06 ENCOUNTER — TRANSFERRED RECORDS (OUTPATIENT)
Dept: HEALTH INFORMATION MANAGEMENT | Facility: HOSPITAL | Age: 63
End: 2018-07-06

## 2018-07-06 LAB
HEP C HIM: NORMAL
HPV ABSTRACT: NORMAL
PAP-ABSTRACT: NORMAL

## 2018-07-23 NOTE — PROGRESS NOTES
Patient Information     Patient Name  Demetra Chang MRN  [id number] Sex  Female   1955      Letter by Miles Alvarez MD at      Author:  Miles Alvarez MD Service:  (none) Author Type:  (none)    Filed:   Encounter Date:  5/15/2017 Status:  (Other)           Demetra Chang  [address]  [address] [place] [id number]          May 15, 2017    Dear Ms. Chang:    This letter is to remind you that you are due for your annual exam with Miles Alvarez MD. Your last comprehensive visit was more than 12 months ago.    A LIMITED refill of fluticasone (50 mcg per actuation) nasal solution (FLONASE) has been called into your pharmacy. Additional refills require you to complete this appointment.    Please call the clinic at [phone] to schedule your appointment.    If you should require additional refills before your scheduled appointment, please contact your pharmacy and we will refill your medication until the date of your appointment.    Thank you for choosing [facility] and [facility] for your health care needs.    Sincerely,    Refill RN  [facility]

## 2018-07-23 NOTE — PROGRESS NOTES
Patient Information     Patient Name  Demetra Chang MRN  [id number] Sex  Female   1955      Letter by Barron Ashley MD at      Author:  Barron Ashley MD Service:  (none) Author Type:  (none)    Filed:   Encounter Date:  2017 Status:  (Other)       Demetra Chang  [address]  [address] [place] [id number]    2017      Dear Ms. Chang,      Please find enclosed a copy of your recent laboratory studies.    Your recent urine culture showed no evidence of ongoing infection.      Results for orders placed or performed in visit on 17       URINALYSIS W REFLEX MICROSCOPIC IF POSITIVE       Result   Value Ref Range    COLOR                      Yellow Yellow Color    CLARITY                    Clear Clear Clarity    SPECIFIC GRAVITY,URINE     1.015 1.010, 1.015, 1.020, 1.025                    PH,URINE                   6.5 6.0, 7.0, 8.0, 5.5, 6.5, 7.5, 8.5                    UROBILINOGEN,QUALITATIVE   Normal Normal EU/dl    PROTEIN, URINE  Negative Negative mg/dL    GLUCOSE, URINE  Negative Negative mg/dL    KETONES,URINE              Trace (A) Negative mg/dL    BILIRUBIN,URINE            Abnormal (A) Negative                    OCCULT BLOOD,URINE         Negative Negative                    NITRITE                    Negative Negative                    LEUKOCYTE ESTERASE         Moderate (A) Negative                   URINALYSIS MICROSCOPIC       Result   Value Ref Range    RBC  11-25 (A) 0-2, None Seen /HPF    WBC  None Seen 0-2, 3-5, None Seen /HPF    BACTERIA                   Few None Seen, Rare, Occasional, Few Bacteria/HPF    EPITHELIAL CELLS           None Seen None Seen, Few Epi/HPF    OTHER  Mucus Present    URINE CULTURE       Result   Value Ref Range    CULTURE        10-50,000 CFU/mL of multiple organisms, probable contaminants         It was a pleasure seeing you recently in the clinic.  If you have any questions regarding these results, please feel free to  call.      Sincerely,       Barron Ashley MD

## 2018-07-24 NOTE — PROGRESS NOTES
Patient Information     Patient Name  Demetra Chang MRN  [id number] Sex  Female   1955      Letter by Miles Alvarez MD at      Author:  Miles Alvarez MD Service:  (none) Author Type:  (none)    Filed:   Encounter Date:  2017 Status:  (Other)           Demetra Chang  [address]  [address] [place] [id number]          May 1, 2017    Dear Ms. Chang:    This letter is to remind you that you are due for your annual exam with Miles Alvarez MD. Your last comprehensive visit was more than 12 months ago.    A LIMITED refill of topiramate (TOPAMAX) 100 mg tablet has been called into your pharmacy. Additional refills require you to complete this appointment.    Please call the clinic at [phone] to schedule your appointment.    Thank you for choosing [facility] and [facility] for your health care needs.    Sincerely,    Refill RN  [facility]

## 2018-07-30 DIAGNOSIS — N36.44 DETRUSOR SPHINCTER DYSSYNERGIA: ICD-10-CM

## 2018-07-31 RX ORDER — DOXAZOSIN 1 MG/1
TABLET ORAL
Qty: 90 TABLET | Refills: 0 | Status: SHIPPED | OUTPATIENT
Start: 2018-07-31 | End: 2018-10-26

## 2018-07-31 NOTE — TELEPHONE ENCOUNTER
Cardura  Last Written Prescription Date:  8/8/17  Last Fill Qty:  90, # Refills:  3  Last Office Visit:  8/8/17    Refilling medication for patient as patient has upcoming appointment with Dr. Negro in Urology.

## 2018-09-04 ENCOUNTER — HOSPITAL ENCOUNTER (OUTPATIENT)
Dept: BONE DENSITY | Facility: HOSPITAL | Age: 63
Discharge: HOME | End: 2018-09-04
Attending: FAMILY MEDICINE | Admitting: FAMILY MEDICINE
Payer: MEDICARE

## 2018-09-04 DIAGNOSIS — M81.0 POSTMENOPAUSAL BONE LOSS: ICD-10-CM

## 2018-09-04 PROCEDURE — 77080 DXA BONE DENSITY AXIAL: CPT | Mod: TC

## 2018-10-26 DIAGNOSIS — N36.44 DETRUSOR SPHINCTER DYSSYNERGIA: ICD-10-CM

## 2018-10-26 RX ORDER — DOXAZOSIN 1 MG/1
TABLET ORAL
Qty: 90 TABLET | Refills: 0 | Status: SHIPPED | OUTPATIENT
Start: 2018-10-26 | End: 2018-11-13

## 2018-10-26 NOTE — TELEPHONE ENCOUNTER
DOXAZOSIN  1mg      Last Written Prescription Date:  7/31/18  Last Fill Quantity: 90,   # refills: 0  Last Office Visit: 8/1/18  Future Office visit:

## 2018-11-13 ENCOUNTER — OFFICE VISIT (OUTPATIENT)
Dept: UROLOGY | Facility: OTHER | Age: 63
End: 2018-11-13
Attending: UROLOGY
Payer: MEDICARE

## 2018-11-13 VITALS
TEMPERATURE: 97.4 F | WEIGHT: 150 LBS | BODY MASS INDEX: 24.99 KG/M2 | HEART RATE: 95 BPM | OXYGEN SATURATION: 97 % | DIASTOLIC BLOOD PRESSURE: 64 MMHG | HEIGHT: 65 IN | SYSTOLIC BLOOD PRESSURE: 100 MMHG

## 2018-11-13 DIAGNOSIS — N36.44 DETRUSOR SPHINCTER DYSSYNERGIA: Primary | ICD-10-CM

## 2018-11-13 PROBLEM — B97.7 HPV (HUMAN PAPILLOMA VIRUS) INFECTION: Status: ACTIVE | Noted: 2017-04-04

## 2018-11-13 PROCEDURE — G0463 HOSPITAL OUTPT CLINIC VISIT: HCPCS

## 2018-11-13 PROCEDURE — 99213 OFFICE O/P EST LOW 20 MIN: CPT | Performed by: UROLOGY

## 2018-11-13 PROCEDURE — 51798 US URINE CAPACITY MEASURE: CPT

## 2018-11-13 PROCEDURE — G0463 HOSPITAL OUTPT CLINIC VISIT: HCPCS | Mod: 25

## 2018-11-13 RX ORDER — DOXAZOSIN 1 MG/1
1 TABLET ORAL EVERY MORNING
Qty: 90 TABLET | Refills: 3 | Status: SHIPPED | OUTPATIENT
Start: 2018-11-13 | End: 2020-02-18

## 2018-11-13 ASSESSMENT — PAIN SCALES - GENERAL: PAINLEVEL: NO PAIN (0)

## 2018-11-13 NOTE — NURSING NOTE
"Chief Complaint   Patient presents with     Consult     New [id number] patient.  No new symptoms.         Initial /64 (BP Location: Left arm, Patient Position: Chair, Cuff Size: Adult Regular)  Pulse 95  Temp 97.4  F (36.3  C) (Tympanic)  Ht 5' 4.5\" (1.638 m)  Wt 150 lb (68 kg)  SpO2 97%  BMI 25.35 kg/m2 Estimated body mass index is 25.35 kg/(m^2) as calculated from the following:    Height as of this encounter: 5' 4.5\" (1.638 m).    Weight as of this encounter: 150 lb (68 kg).  Medication Reconciliation: complete    COOPER MARCOS LPN    "

## 2018-11-13 NOTE — PROGRESS NOTES
"[place]   CHIEF COMPLAINT   It was my pleasure to see Demetra Chang who is a 63 year old female for follow-up of DSD.      HPI   Demetra Chagn is a very pleasant 63 year old female who presents with a long history of DSD managed with Cardura. She has followed with Urology annually for many years - initial diagnosis made after work up for urinary retention. She is doing well today with no issues and just needs a medication refill. No issues with urinary leakage or UTIs.    PHYSICAL EXAM  Patient is a 63 year old  female   Vitals: Blood pressure 100/64, pulse 95, temperature 97.4  F (36.3  C), temperature source Tympanic, height 1.638 m (5' 4.5\"), weight 68 kg (150 lb), SpO2 97 %.  General Appearance Adult: Body mass index is 25.35 kg/(m^2).  Alert, no acute distress, oriented  HENT: throat/mouth:normal, good dentition  Lungs: no respiratory distress, or pursed lip breathing  Heart: No obvious jugular venous distension present  Abdomen: soft, nontender, no organomegaly or masses  Musculoskeltal: extremities normal, no peripheral edema  Skin: no suspicious lesions or rashes  Neuro: Alert, oriented, speech and mentation normal  Psych: affect and mood normal  Gait: Normal    PVR = 102cc    ASSESSMENT and PLAN  62 y/o female with long history of DSD managed well with Cardura    - Script refilled for 1 year  - discussed that she may follow with Dr. Levy for this and follow up with Urology if she develops worsening incontinence or recurrent UTIs      I spent over 15 minutes with the patient.  Over half this time was spent on counseling regarding the above.    Suhail Wilcox   Urology  [facility]  Clinic Phone [phone]      "

## 2018-11-13 NOTE — MR AVS SNAPSHOT
"              After Visit Summary   11/13/2018    Demetra Chang    MRN: [id number]           Patient Information     Date Of Birth          1955        Visit Information        Provider Department      11/13/2018 2:00 PM Suhail Wilcox MD [facility]        Today's Diagnoses     Detrusor sphincter dyssynergia    -  1       Follow-ups after your visit        Follow-up notes from your care team     Return if symptoms worsen or fail to improve.      Who to contact     If you have questions or need follow up information about today's clinic visit or your schedule please contact [facility] directly at [phone].  Normal or non-critical lab and imaging results will be communicated to you by MyChart, letter or phone within 4 business days after the clinic has received the results. If you do not hear from us within 7 days, please contact the clinic through MyChart or phone. If you have a critical or abnormal lab result, we will notify you by phone as soon as possible.  Submit refill requests through [facility] or call your pharmacy and they will forward the refill request to us. Please allow 3 business days for your refill to be completed.          Additional Information About Your Visit        Care EveryWhere ID     This is your Care EveryWhere ID. This could be used by other organizations to access your [place] medical records  [id number]        Your Vitals Were     Pulse Temperature Height Pulse Oximetry BMI (Body Mass Index)       95 97.4  F (36.3  C) (Tympanic) 1.638 m (5' 4.5\") 97% 25.35 kg/m2        Blood Pressure from Last 3 Encounters:   11/13/18 100/64   08/08/17 110/60   07/27/17 112/72    Weight from Last 3 Encounters:   11/13/18 68 kg (150 lb)   08/08/17 71.2 kg (157 lb)   07/27/17 73 kg (161 lb)              Today, you had the following     No orders found for display         Today's Medication Changes          These changes are accurate as of 11/13/18  " 2:23 PM.  If you have any questions, ask your nurse or doctor.               These medicines have changed or have updated prescriptions.        Dose/Directions    ADDERALL XR 30 MG per 24 hr capsule   This may have changed:  Another medication with the same name was removed. Continue taking this medication, and follow the directions you see here.   Generic drug:  amphetamine-dextroamphetamine   Changed by:  Suhail Wilcox MD        Dose:  1 tablet   Take 1 tablet by mouth 2 times daily   Refills:  0       ARIPiprazole 10 MG tablet   Commonly known as:  ABILIFY   This may have changed:  Another medication with the same name was removed. Continue taking this medication, and follow the directions you see here.   Changed by:  Suhail Wilcox MD        Dose:  10 mg   Take 10 mg by mouth   Refills:  0       aspirin 81 MG tablet   This may have changed:  Another medication with the same name was removed. Continue taking this medication, and follow the directions you see here.   Changed by:  Suhail Wilcox MD        Dose:  81 mg   Take 81 mg by mouth daily   Refills:  0       doxazosin 1 MG tablet   Commonly known as:  CARDURA   This may have changed:    - See the new instructions.  - Another medication with the same name was removed. Continue taking this medication, and follow the directions you see here.   Used for:  Detrusor sphincter dyssynergia   Changed by:  Suhail Wilcox MD        Dose:  1 mg   Take 1 tablet (1 mg) by mouth every morning   Quantity:  90 tablet   Refills:  3       EFFEXOR XR PO   This may have changed:  Another medication with the same name was removed. Continue taking this medication, and follow the directions you see here.   Changed by:  Suhail Wilcox MD        Dose:  75 mg   Take 75 mg by mouth 3 times daily   Refills:  0       FLUoxetine 40 MG capsule   Commonly known as:  PROzac   This may have changed:  Another medication with the same name was removed. Continue taking this medication, and  follow the directions you see here.   Changed by:  Suhail Wilcox MD        Dose:  40 mg   Take 40 mg by mouth 2 times daily   Refills:  0       MULTI-VITAMIN PO   This may have changed:  Another medication with the same name was removed. Continue taking this medication, and follow the directions you see here.   Changed by:  Suhail Wilcox MD        Take by mouth daily   Refills:  0       TAPAZOLE 5 MG tablet   This may have changed:  Another medication with the same name was removed. Continue taking this medication, and follow the directions you see here.   Generic drug:  methimazole   Changed by:  Suhail Wilcox MD        Dose:  5 mg   Take 5 mg by mouth daily   Refills:  0       TOPAMAX 100 MG tablet   This may have changed:  Another medication with the same name was removed. Continue taking this medication, and follow the directions you see here.   Generic drug:  topiramate   Changed by:  Suahil Wilcox MD        Dose:  100 mg   Take 100 mg by mouth 2 times daily   Refills:  0       vitamin D3 1000 units Caps   This may have changed:  Another medication with the same name was removed. Continue taking this medication, and follow the directions you see here.   Changed by:  Suhail Wilcox MD        Dose:  1000 Units   Take 1,000 Units by mouth daily   Refills:  0         Stop taking these medicines if you haven't already. Please contact your care team if you have questions.     Aspirin-Calcium Carbonate  MG Tabs   Stopped by:  Suhail Wilcox MD           clonazePAM 0.5 MG tablet   Commonly known as:  klonoPIN   Stopped by:  Suhail Wilcox MD           clonazePAM 1 MG tablet   Commonly known as:  klonoPIN   Stopped by:  Suhail Wilcox MD           klonoPIN 1 MG tablet   Generic drug:  clonazePAM   Stopped by:  Suhail Wilcox MD           sulfamethoxazole-trimethoprim 800-160 MG per tablet   Commonly known as:  BACTRIM DS/SEPTRA DS   Stopped by:  Suhail Wilcox MD                Where to get your  medicines      These medications were sent to [id number] - [facility], [place] - [address]  [address], [facility] [address]     Phone:  [phone]     doxazosin 1 MG tablet                Primary Care Provider Office Phone # Fax #    Ashley Levy [phone]       [facility] [address]  [place] [address]        Equal Access to Services     [facility] Center[facility]ES[facility] : Hadii aad ku hadasho Soomaali, waaxda luqadaha, qaybta kaalmada adeegyada, waxay idiin hayaan adeeg kharash ladelvis . So [facility] [phone].    ATENCIÓN: Si habla español, tiene a handy disposición servicios gratuitos de asistencia lingüística. [facility] [phone].    We comply with applicable federal civil rights laws and [place] laws. We do not discriminate on the basis of race, color, national origin, age, disability, sex, sexual orientation, or gender identity.            Thank you!     Thank you for choosing [facility]  for your care. Our goal is always to provide you with excellent care. Hearing back from our patients is one way we can continue to improve our services. Please take a few minutes to complete the written survey that you may receive in the mail after your visit with us. Thank you!             Your Updated Medication List - Protect others around you: Learn how to safely use, store and throw away your medicines at www.disposemymeds.org.          This list is accurate as of 11/13/18  2:23 PM.  Always use your most recent med list.                   Brand Name Dispense Instructions for use Diagnosis    ADDERALL XR 30 MG per 24 hr capsule   Generic drug:  amphetamine-dextroamphetamine      Take 1 tablet by mouth 2 times daily        ARIPiprazole 10 MG tablet    ABILIFY     Take 10 mg by mouth        aspirin 81 MG tablet      Take 81 mg by mouth daily        CALCIUM 600 PO      Take 500 mg by mouth 2 times daily        cyanocobalamin 1000 MCG  tablet    vitamin  B-12     Take 500 mcg by mouth daily        doxazosin 1 MG tablet    CARDURA    90 tablet    Take 1 tablet (1 mg) by mouth every morning    Detrusor sphincter dyssynergia       EFFEXOR XR PO      Take 75 mg by mouth 3 times daily        FLUoxetine 40 MG capsule    PROzac     Take 40 mg by mouth 2 times daily        MULTI-VITAMIN PO      Take by mouth daily        TAPAZOLE 5 MG tablet   Generic drug:  methimazole      Take 5 mg by mouth daily        TOPAMAX 100 MG tablet   Generic drug:  topiramate      Take 100 mg by mouth 2 times daily        vitamin D3 1000 units Caps      Take 1,000 Units by mouth daily

## 2019-04-25 NOTE — TELEPHONE ENCOUNTER
Patient Information     Patient Name MRDemetra Navarrete [id number] Female 1955      Telephone Encounter by Talita Escobar at 2017  3:59 PM     Author:  Talita Escobar Service:  (none) Author Type:  (none)     Filed:  2017  4:01 PM Encounter Date:  2017 Status:  Signed     :  Talita Escobar            RE: IN FOR UTI A WEEK AGO, FEEL ITS NOT GONE YET.         
negative...

## 2019-07-23 ENCOUNTER — TRANSFERRED RECORDS (OUTPATIENT)
Dept: HEALTH INFORMATION MANAGEMENT | Facility: HOSPITAL | Age: 64
End: 2019-07-23

## 2019-07-23 ENCOUNTER — TRANSFERRED RECORDS (OUTPATIENT)
Dept: MULTI SPECIALTY CLINIC | Facility: CLINIC | Age: 64
End: 2019-07-23

## 2019-07-23 LAB
CHOLEST SERPL-MCNC: 257 MG/DL (ref 114–200)
HDLC SERPL-MCNC: 66 MG/DL (ref 40–60)
LDLC SERPL CALC-MCNC: 165 MG/DL
TRIGL SERPL-MCNC: 132 MG/DL (ref 10–200)

## 2019-11-22 ENCOUNTER — OFFICE VISIT (OUTPATIENT)
Dept: FAMILY MEDICINE | Facility: OTHER | Age: 64
End: 2019-11-22
Attending: NURSE PRACTITIONER
Payer: MEDICARE

## 2019-11-22 VITALS
OXYGEN SATURATION: 99 % | HEART RATE: 109 BPM | BODY MASS INDEX: 31.2 KG/M2 | SYSTOLIC BLOOD PRESSURE: 102 MMHG | DIASTOLIC BLOOD PRESSURE: 80 MMHG | RESPIRATION RATE: 16 BRPM | TEMPERATURE: 97.3 F | WEIGHT: 184.6 LBS

## 2019-11-22 DIAGNOSIS — H10.9 BACTERIAL CONJUNCTIVITIS OF RIGHT EYE: Primary | ICD-10-CM

## 2019-11-22 DIAGNOSIS — B30.9 VIRAL CONJUNCTIVITIS: ICD-10-CM

## 2019-11-22 DIAGNOSIS — J00 COMMON COLD: ICD-10-CM

## 2019-11-22 PROCEDURE — G0463 HOSPITAL OUTPT CLINIC VISIT: HCPCS

## 2019-11-22 PROCEDURE — 99213 OFFICE O/P EST LOW 20 MIN: CPT | Performed by: NURSE PRACTITIONER

## 2019-11-22 RX ORDER — GUAIFENESIN AND DEXTROMETHORPHAN HYDROBROMIDE 600; 30 MG/1; MG/1
1 TABLET, EXTENDED RELEASE ORAL EVERY 12 HOURS
Qty: 30 TABLET | Refills: 0 | Status: SHIPPED | OUTPATIENT
Start: 2019-11-22 | End: 2020-03-12

## 2019-11-22 RX ORDER — ERYTHROMYCIN 5 MG/G
0.5 OINTMENT OPHTHALMIC AT BEDTIME
Qty: 1 G | Refills: 0 | Status: SHIPPED | OUTPATIENT
Start: 2019-11-22 | End: 2020-01-31

## 2019-11-22 ASSESSMENT — ENCOUNTER SYMPTOMS
EYE ITCHING: 0
EYE DISCHARGE: 1
EYE PAIN: 0
EYE REDNESS: 1
PHOTOPHOBIA: 0

## 2019-11-22 ASSESSMENT — VISUAL ACUITY: OU: 1

## 2019-11-22 NOTE — NURSING NOTE
.Patient presents to clinic with c/o right eye redness, and chest congestion.  Alicia Monge LPN ...... 11/22/2019 2:39 PM      Chief Complaint   Patient presents with     Eye Problem         Medication Reconciliation: complete    Bianca Monge LPN

## 2019-11-22 NOTE — PROGRESS NOTES
SUBJECTIVE:   Demetar Chang is a 64 year old female who presents to clinic today for the following health issues:    HPI  Patient presents for evaluation for concerns about pink eye. She notes she started with a cold about a week ago, then noticed in the last two days she was having right eye redness and discharge. She reports it was crusted shut in the morning and continues to have some discharge throughout the day. No trauma to the eye. No vision changes. No photophobia. Eye is injected. She noticed today that her left eye started to have some discharge. She is using warm compresses to clean her eye. No history of seasonal allergies.     Patient Active Problem List    Diagnosis Date Noted     Personal history of other mental disorder 03/09/2018     Priority: Medium     HPV (human papilloma virus) infection 04/04/2017     Priority: Medium     Overview:   4/17; cotesting in yr       Visit for suture removal 09/08/2016     Priority: Medium     Skin lesion 09/01/2016     Priority: Medium     Seborrheic keratosis 08/26/2016     Priority: Medium     Migraine without aura and without status migrainosus, not intractable 04/28/2016     Priority: Medium     Osteopenia, senile 02/09/2016     Priority: Medium     Overview:   dexa 2013, slightly worse Dexa 9/4/18       Anxiety 02/03/2016     Priority: Medium     Moderate episode of recurrent major depressive disorder (H) 02/03/2016     Priority: Medium     Narcolepsy 09/02/2014     Priority: Medium     Detrusor sphincter dyssynergia 08/27/2013     Priority: Medium     H/O: duodenal ulcer 12/13/2012     Priority: Medium     Overview:   2005 seen on EGD       Incomplete bladder emptying 12/13/2012     Priority: Medium     Overview:   Detrusor sphincter dyssnergia. Cardura, followed by [facility] urology       Disorder of upper respiratory system 02/09/2012     Priority: Medium     Bipolar affective disorder (H) 09/20/2011     Priority: Medium     Pain in joint, ankle and  foot 05/12/2010     Priority: Medium     Overview:   IMO Update 10/11       Toxic uninodular goiter 05/13/2008     Priority: Medium     Overview:   IMO Update 10/11       Past Medical History:   Diagnosis Date     Personal history of other diseases of the digestive system (CODE)     PUD - Hospitalized ~ 2004     Personal history of other medical treatment (CODE)     Three      Past Surgical History:   Procedure Laterality Date     CLOSED REDUCTION ANKLE      1999,Right ankle ORIF with hardware removal 2014.     COLONOSCOPY  11/16/2016    Follow up in 10 years per Dr. Cosby [facility]     ENDOSCOPIC SINUS SURGERY      1990s       Review of Systems   Eyes: Positive for discharge and redness. Negative for photophobia, pain, itching and visual disturbance.        OBJECTIVE:     /80 (BP Location: Right arm, Patient Position: Sitting, Cuff Size: Adult Regular)   Pulse 109   Temp 97.3  F (36.3  C)   Resp 16   Wt 83.7 kg (184 lb 9.6 oz)   SpO2 99%   BMI 31.20 kg/m    Body mass index is 31.2 kg/m .  Physical Exam  Constitutional:       Appearance: Normal appearance.   HENT:      Head: Normocephalic.   Eyes:      General: Vision grossly intact.         Right eye: No discharge.         Left eye: No discharge.      Extraocular Movements: Extraocular movements intact.      Conjunctiva/sclera:      Right eye: Right conjunctiva is injected. No chemosis or exudate.     Left eye: Left conjunctiva is not injected. No chemosis or exudate.  Neurological:      Mental Status: She is alert.     Eye: She does has some mild upper lid swelling on the right side. No discharge noted on exam today.     Diagnostic Test Results:  none     ASSESSMENT/PLAN:   1. Viral conjunctivitis  Likely not allergic. I think this likely represent viral conjunctivitis based on her cold symptoms prior to eye symptoms developing. With this there is no treatment. It is contagious so wash your hands thorough and avoid contact with your eye.  Usually this improves over the next 1-2 weeks as your cold improves. Don't be alarmed if your right eye starts with similar symptoms.     2. Bacterial conjunctivitis of right eye  Likely viral in nature. Patient would like a prescription if it does not improve or starts to worsen. Discussed symptoms of bacterial conjunctivitis with large amounts of discharge. If she worsens she can start in the next 3 days with ointment. Follow-up as needed.   - erythromycin (ROMYCIN) 5 MG/GM ophthalmic ointment; Place 0.5 inches into the right eye At Bedtime for 5 days  Dispense: 1 g; Refill: 0    3. Common cold  Requested refill of Mucinex.   - dextromethorphan-guaiFENesin (MUCINEX DM)  MG 12 hr tablet; Take 1 tablet by mouth every 12 hours  Dispense: 30 tablet; Refill: 0    Maliha Verdin [facility] AND \[facility]\""

## 2020-01-02 DIAGNOSIS — N36.44 DETRUSOR SPHINCTER DYSSYNERGIA: Primary | ICD-10-CM

## 2020-01-31 ENCOUNTER — OFFICE VISIT (OUTPATIENT)
Dept: FAMILY MEDICINE | Facility: OTHER | Age: 65
End: 2020-01-31
Attending: INTERNAL MEDICINE
Payer: MEDICARE

## 2020-01-31 VITALS
WEIGHT: 180.5 LBS | BODY MASS INDEX: 30.81 KG/M2 | SYSTOLIC BLOOD PRESSURE: 106 MMHG | OXYGEN SATURATION: 95 % | DIASTOLIC BLOOD PRESSURE: 78 MMHG | RESPIRATION RATE: 16 BRPM | HEART RATE: 98 BPM | TEMPERATURE: 96.9 F | HEIGHT: 64 IN

## 2020-01-31 DIAGNOSIS — M54.42 ACUTE BILATERAL LOW BACK PAIN WITH LEFT-SIDED SCIATICA: Primary | ICD-10-CM

## 2020-01-31 PROCEDURE — G0463 HOSPITAL OUTPT CLINIC VISIT: HCPCS

## 2020-01-31 PROCEDURE — 99213 OFFICE O/P EST LOW 20 MIN: CPT | Performed by: FAMILY MEDICINE

## 2020-01-31 ASSESSMENT — MIFFLIN-ST. JEOR: SCORE: 1353.74

## 2020-01-31 ASSESSMENT — PAIN SCALES - GENERAL: PAINLEVEL: WORST PAIN (10)

## 2020-01-31 NOTE — NURSING NOTE
"Chief Complaint   Patient presents with     Musculoskeletal Problem     left hip     Patient is here for pain in the left hip/upper leg that started Monday after she bent over to wash her hair in the sink. Patient states she is in constant pain. Patient has tried a hot pack, muscle pain patches, biofreeze, aleve, tylenol with no relief from anything.     Initial /78   Pulse 98   Temp 96.9  F (36.1  C) (Tympanic)   Resp 16   Ht 1.626 m (5' 4\")   Wt 81.9 kg (180 lb 8 oz)   SpO2 95%   BMI 30.98 kg/m   Estimated body mass index is 30.98 kg/m  as calculated from the following:    Height as of this encounter: 1.626 m (5' 4\").    Weight as of this encounter: 81.9 kg (180 lb 8 oz).  Medication Reconciliation: complete    Radha Cesar LPN  "

## 2020-01-31 NOTE — PATIENT INSTRUCTIONS
Patient Education     Understanding Lumbar Radiculopathy    Lumbar radiculopathy is irritation or inflammation of a nerve root in the low back. It causes symptoms that spread out from the back down one or both legs. To understand this condition, it helps to understand the parts of the spine:    Vertebrae. These are bones that stack to form the spine. The lumbar spine contains the 5 bottom vertebrae.    Disks. These are soft pads of tissue between the vertebrae. They act as shock absorbers for the spine.    Spinal canal. This is a tunnel formed within the stacked vertebrae. In the lumbar spine, nerves run through this canal.    Nerves. These branch off and leave the spinal canal, traveling out to parts of the body. As they leave the spinal canal, nerves pass through openings between the vertebrae. The nerve root is the part of the nerve that is closest to the spinal canal.    Sciatic nerve. This is a large nerve formed from several nerve roots in the low back. This nerve extends down the back of the leg to the foot.  With lumbar radiculopathy, nerve roots in the low back become irritated. This leads to pain and symptoms. The sciatic nerve is commonly involved, so the condition is often called sciatica.  What causes lumbar radiculopathy?  Aging, injury, poor posture, extra body weight, and other issues can lead to problems in the low back. These problems may then irritate nerve roots. They include:    Damage to a disk in the lumbar spine. The damaged disk may then press on nearby nerve roots.    Degeneration from wear and tear, and aging. This can lead to narrowing (stenosis) of the openings between the vertebrae. The narrowed openings press on nerve roots as they leave the spinal canal.    Unstable spine. This is when a vertebra slips forward. It can then press on a nerve root.  Other, less common things can put pressure on nerves in the low back. These include diabetes, infection, or a tumor.  Symptoms of lumbar  radiculopathy  These include:    Pain in the low back    Pain, numbness, tingling, or weakness that travels into the buttocks, hip, groin, or leg    Muscle spasms  Treatment for lumbar radiculopathy  In most cases, your healthcare provider will first try treatments that help relieve symptoms. These may include:    Prescription and over-the-counter pain medicines. These help relieve pain, swelling, and irritation.    Limits on positions and activities that increase pain. But lying in bed or avoiding all movement is only recommended for a short period of time.    Physical therapy, including exercises and stretches. This helps decrease pain and increase movement and function.    Steroid shots into the lower back. This may help relieve symptoms for a time.    Weight-loss program. If you are overweight, losing extra pounds may help relieve symptoms.  In some cases, you may need surgery to fix the underlying problem. This depends on the cause, the symptoms, and how long the pain has lasted.  Possible complications  Over time, an irritated and inflamed nerve may become damaged. This may lead to long-lasting (permanent) numbness or weakness in your legs and feet. If symptoms change suddenly or get worse, be sure to let your healthcare provider know.  When to call your healthcare provider  Call your healthcare provider right away if you have any of these:    New pain or pain that gets worse    New or increasing weakness, tingling, or numbness in your leg or foot    Problems controlling your bladder or bowel   Date Last Reviewed: 3/10/2016    0565-6575 [employer]. [address] [place] [address], [place] [address]. All rights reserved. This information is not intended as a substitute for professional medical care. Always follow your healthcare professional's instructions.

## 2020-01-31 NOTE — PROGRESS NOTES
"Nursing Notes:   Radha Cesar LPN  1/31/2020 12:12 PM  Signed  Chief Complaint   Patient presents with     Musculoskeletal Problem     left hip     Patient is here for pain in the left hip/upper leg that started Monday after she bent over to wash her hair in the sink. Patient states she is in constant pain. Patient has tried a hot pack, muscle pain patches, biofreeze, aleve, tylenol with no relief from anything.     Initial /78   Pulse 98   Temp 96.9  F (36.1  C) (Tympanic)   Resp 16   Ht 1.626 m (5' 4\")   Wt 81.9 kg (180 lb 8 oz)   SpO2 95%   BMI 30.98 kg/m    Estimated body mass index is 30.98 kg/m  as calculated from the following:    Height as of this encounter: 1.626 m (5' 4\").    Weight as of this encounter: 81.9 kg (180 lb 8 oz).  Medication Reconciliation: complete    Radha Cesar LPN    SUBJECTIVE:   Demetra Chang is a 64 year old female who presents to clinic today for the following health issues:    Here with onset of left hip/thigh area pain that started on Monday. She bent forward into kitchen sink to wash her hair and had acute pain in a vague area of \"hip\" but likely low back/sciatic area and then over the next few days pain seemed to be in thigh area laterally and only on left. Has tried many over the counter medications but still sleeping poorly. NO weakness in LE. No previous hip issues but at times LBP.     HPI    Patient Active Problem List   Diagnosis     Detrusor sphincter dyssynergia     Anxiety     Migraine without aura and without status migrainosus, not intractable     Moderate episode of recurrent major depressive disorder (H)     Disorder of upper respiratory system     Personal history of other mental disorder     Seborrheic keratosis     Skin lesion     Visit for suture removal     Bipolar affective disorder (H)     H/O: duodenal ulcer     HPV (human papilloma virus) infection     Incomplete bladder emptying     Narcolepsy     Osteopenia, senile     Pain in joint, " ankle and foot     Toxic uninodular goiter     Past Surgical History:   Procedure Laterality Date     CLOSED REDUCTION ANKLE      1999,Right ankle ORIF with hardware removal 2014.     COLONOSCOPY  11/16/2016    Follow up in 10 years per Dr. Cosby [facility]     ENDOSCOPIC SINUS SURGERY      1990s       Social History     Tobacco Use     Smoking status: Never Smoker     Smokeless tobacco: Never Used   Substance Use Topics     Alcohol use: No     Alcohol/week: 0.0 standard drinks     Family History   Problem Relation Age of Onset     Other - See Comments Mother         Lung cancer     Other - See Comments Father         Cancer - ? GI     Family History Negative Sister         Good Health     Family History Negative Sister         Good Health         Current Outpatient Medications   Medication Sig Dispense Refill     acetaminophen-codeine (TYLENOL #3) 300-30 MG tablet Take 1 tablet by mouth every 6 hours as needed for severe pain 10 tablet 0     amphetamine-dextroamphetamine (ADDERALL XR) 30 MG per 24 hr capsule Take 1 tablet by mouth 2 times daily       ARIPiprazole (ABILIFY) 10 MG tablet Take 10 mg by mouth       aspirin 81 MG tablet Take 81 mg by mouth daily       Calcium Carbonate (CALCIUM 600 PO) Take 500 mg by mouth 2 times daily        Cholecalciferol (VITAMIN D3) 1000 UNITS CAPS Take 1,000 Units by mouth daily       cyanocobalamin (VITAMIN  B-12) 1000 MCG tablet Take 500 mcg by mouth daily        doxazosin (CARDURA) 1 MG tablet Take 1 tablet (1 mg) by mouth every morning 90 tablet 3     FLUoxetine (PROZAC) 40 MG capsule Take 40 mg by mouth 2 times daily       methimazole (TAPAZOLE) 5 MG tablet Take 5 mg by mouth daily       Multiple Vitamin (MULTI-VITAMIN PO) Take by mouth daily       topiramate (TOPAMAX) 100 MG tablet Take 100 mg by mouth 2 times daily        Venlafaxine HCl (EFFEXOR XR PO) Take 75 mg by mouth 3 times daily       dextromethorphan-guaiFENesin (MUCINEX DM)  MG 12 hr tablet Take 1  "tablet by mouth every 12 hours (Patient not taking: Reported on 1/31/2020) 30 tablet 0     No Known Allergies    Review of Systems     OBJECTIVE:     /78   Pulse 98   Temp 96.9  F (36.1  C) (Tympanic)   Resp 16   Ht 1.626 m (5' 4\")   Wt 81.9 kg (180 lb 8 oz)   SpO2 95%   BMI 30.98 kg/m    Body mass index is 30.98 kg/m .  Physical Exam  Vitals signs and nursing note reviewed.   Musculoskeletal: Normal range of motion.         General: No deformity.      Comments: Ambulates without limp  Normal passive ROM in both hips and does not elicit pain.  No palpable pain in low back or sciatica notch   Neurological:      Mental Status: She is alert.         Diagnostic Test Results:  none     ASSESSMENT/PLAN:           ICD-10-CM    1. Acute bilateral low back pain with left-sided sciatica M54.42 acetaminophen-codeine (TYLENOL #3) 300-30 MG tablet     Plan;  Continue heat or cold and topicals.  Consider chiropractic.  Symptoms suggest L5-S1 radicular symptoms and if not improving then imaging.  Tylenol #3 provided for pain. Anything more needs to come from her primary provider.       Iona Liang MD  [facility]  "

## 2020-02-06 DIAGNOSIS — M54.42 ACUTE BILATERAL LOW BACK PAIN WITH LEFT-SIDED SCIATICA: ICD-10-CM

## 2020-02-07 NOTE — TELEPHONE ENCOUNTER
[facility] #[id number]  sent Rx request for the following:         Last Office Visit:              1/31/2020   Future Office visit:          nonw     Disp Refills Start End EVE   acetaminophen-codeine (TYLENOL #3) 300-30 MG tablet 10 tablet 0 1/31/2020 2/3/2020 --   Sig - Route: Take 1 tablet by mouth every 6 hours as needed for severe pain - Oral         Routing refill request to provider for review/approval because:  Drug not on the [facility], P or [facility] refill protocol or controlled substance      Attempted to reach patient by telephone, unable. Refused prescription d/t Pt has no primary here and was seen in [facility]. Patient needs to be reevaluated or request this from her primary at [facility].   Yoon Cabello RN .............. 2/7/2020  9:26 AM

## 2020-02-17 ENCOUNTER — APPOINTMENT (OUTPATIENT)
Dept: GENERAL RADIOLOGY | Facility: OTHER | Age: 65
End: 2020-02-17
Attending: FAMILY MEDICINE
Payer: MEDICARE

## 2020-02-17 ENCOUNTER — HOSPITAL ENCOUNTER (EMERGENCY)
Facility: OTHER | Age: 65
Discharge: HOME | End: 2020-02-17
Attending: FAMILY MEDICINE | Admitting: FAMILY MEDICINE
Payer: MEDICARE

## 2020-02-17 VITALS
RESPIRATION RATE: 16 BRPM | OXYGEN SATURATION: 100 % | SYSTOLIC BLOOD PRESSURE: 121 MMHG | TEMPERATURE: 97.8 F | DIASTOLIC BLOOD PRESSURE: 83 MMHG | HEIGHT: 64 IN | WEIGHT: 178 LBS | BODY MASS INDEX: 30.39 KG/M2

## 2020-02-17 DIAGNOSIS — M54.10 BACK PAIN WITH RADICULOPATHY: ICD-10-CM

## 2020-02-17 DIAGNOSIS — N39.0 UTI (URINARY TRACT INFECTION) WITH PYURIA: ICD-10-CM

## 2020-02-17 DIAGNOSIS — M47.816 FACET DEGENERATION OF LUMBAR REGION: ICD-10-CM

## 2020-02-17 LAB
ALBUMIN UR-MCNC: 30 MG/DL
APPEARANCE UR: ABNORMAL
BASOPHILS # BLD AUTO: 0 10E9/L (ref 0–0.2)
BASOPHILS NFR BLD AUTO: 0.6 %
BILIRUB UR QL STRIP: NEGATIVE
COLOR UR AUTO: YELLOW
DIFFERENTIAL METHOD BLD: NORMAL
EOSINOPHIL # BLD AUTO: 0.2 10E9/L (ref 0–0.7)
EOSINOPHIL NFR BLD AUTO: 2.1 %
ERYTHROCYTE [DISTWIDTH] IN BLOOD BY AUTOMATED COUNT: 12.3 % (ref 10–15)
ERYTHROCYTE [SEDIMENTATION RATE] IN BLOOD BY WESTERGREN METHOD: 64 MM/H (ref 1–15)
GLUCOSE UR STRIP-MCNC: NEGATIVE MG/DL
HCT VFR BLD AUTO: 41.7 % (ref 35–47)
HGB BLD-MCNC: 13.2 G/DL (ref 11.7–15.7)
HGB UR QL STRIP: NEGATIVE
IMM GRANULOCYTES # BLD: 0 10E9/L (ref 0–0.4)
IMM GRANULOCYTES NFR BLD: 0.3 %
KETONES UR STRIP-MCNC: 5 MG/DL
LEUKOCYTE ESTERASE UR QL STRIP: ABNORMAL
LYMPHOCYTES # BLD AUTO: 1 10E9/L (ref 0.8–5.3)
LYMPHOCYTES NFR BLD AUTO: 13.9 %
MCH RBC QN AUTO: 30.8 PG (ref 26.5–33)
MCHC RBC AUTO-ENTMCNC: 31.7 G/DL (ref 31.5–36.5)
MCV RBC AUTO: 97 FL (ref 78–100)
MONOCYTES # BLD AUTO: 0.5 10E9/L (ref 0–1.3)
MONOCYTES NFR BLD AUTO: 7 %
MUCOUS THREADS #/AREA URNS LPF: PRESENT /LPF
NEUTROPHILS # BLD AUTO: 5.4 10E9/L (ref 1.6–8.3)
NEUTROPHILS NFR BLD AUTO: 76.1 %
NITRATE UR QL: POSITIVE
PH UR STRIP: 6 PH (ref 5–7)
PLATELET # BLD AUTO: 324 10E9/L (ref 150–450)
RBC # BLD AUTO: 4.29 10E12/L (ref 3.8–5.2)
RBC #/AREA URNS AUTO: 2 /HPF (ref 0–2)
SOURCE: ABNORMAL
SP GR UR STRIP: 1.02 (ref 1–1.03)
SQUAMOUS #/AREA URNS AUTO: 10 /HPF (ref 0–1)
UROBILINOGEN UR STRIP-MCNC: 2 MG/DL (ref 0–2)
WBC # BLD AUTO: 7.1 10E9/L (ref 4–11)
WBC #/AREA URNS AUTO: 64 /HPF (ref 0–5)
WBC CLUMPS #/AREA URNS HPF: PRESENT /HPF

## 2020-02-17 PROCEDURE — 99284 EMERGENCY DEPT VISIT MOD MDM: CPT | Performed by: FAMILY MEDICINE

## 2020-02-17 PROCEDURE — 72100 X-RAY EXAM L-S SPINE 2/3 VWS: CPT

## 2020-02-17 PROCEDURE — 99283 EMERGENCY DEPT VISIT LOW MDM: CPT | Mod: Z6 | Performed by: FAMILY MEDICINE

## 2020-02-17 PROCEDURE — 87086 URINE CULTURE/COLONY COUNT: CPT | Performed by: FAMILY MEDICINE

## 2020-02-17 PROCEDURE — 81003 URINALYSIS AUTO W/O SCOPE: CPT | Performed by: FAMILY MEDICINE

## 2020-02-17 PROCEDURE — 85652 RBC SED RATE AUTOMATED: CPT | Performed by: FAMILY MEDICINE

## 2020-02-17 PROCEDURE — 36415 COLL VENOUS BLD VENIPUNCTURE: CPT | Performed by: FAMILY MEDICINE

## 2020-02-17 PROCEDURE — 25000132 ZZH RX MED GY IP 250 OP 250 PS 637: Mod: GY | Performed by: FAMILY MEDICINE

## 2020-02-17 PROCEDURE — 87088 URINE BACTERIA CULTURE: CPT | Performed by: FAMILY MEDICINE

## 2020-02-17 PROCEDURE — 85025 COMPLETE CBC W/AUTO DIFF WBC: CPT | Performed by: FAMILY MEDICINE

## 2020-02-17 RX ORDER — HYDROCODONE BITARTRATE AND ACETAMINOPHEN 5; 325 MG/1; MG/1
1 TABLET ORAL EVERY 6 HOURS PRN
Qty: 6 TABLET | Refills: 0 | Status: SHIPPED | OUTPATIENT
Start: 2020-02-17 | End: 2020-03-12

## 2020-02-17 RX ORDER — HYDROCODONE BITARTRATE AND ACETAMINOPHEN 5; 325 MG/1; MG/1
1 TABLET ORAL ONCE
Status: COMPLETED | OUTPATIENT
Start: 2020-02-17 | End: 2020-02-17

## 2020-02-17 RX ORDER — NITROFURANTOIN 25; 75 MG/1; MG/1
100 CAPSULE ORAL 2 TIMES DAILY
Qty: 14 CAPSULE | Refills: 0 | Status: SHIPPED | OUTPATIENT
Start: 2020-02-17 | End: 2020-03-12

## 2020-02-17 RX ADMIN — HYDROCODONE BITARTRATE AND ACETAMINOPHEN 1 TABLET: 5; 325 TABLET ORAL at 13:47

## 2020-02-17 ASSESSMENT — ENCOUNTER SYMPTOMS
LEG PAIN: 1
HEADACHES: 0
PARESTHESIAS: 0
DYSURIA: 0
NUMBNESS: 0
PERIANAL NUMBNESS: 0
TINGLING: 0
ABDOMINAL PAIN: 0
WEIGHT LOSS: 0
WEAKNESS: 0
FEVER: 0
BACK PAIN: 1
BOWEL INCONTINENCE: 0

## 2020-02-17 ASSESSMENT — MIFFLIN-ST. JEOR: SCORE: 1342.4

## 2020-02-17 NOTE — ED AVS SNAPSHOT
[facility]  [address]  [place] [address]  Phone:  [phone]  Fax:  [phone]                                    Demetra Chang   MRN: [id number]    Department:  [facility]   Date of Visit:  2/17/2020           After Visit Summary Signature Page    I have received my discharge instructions, and my questions have been answered. I have discussed any challenges I see with this plan with the nurse or doctor.    ..........................................................................................................................................  Patient/Patient Representative Signature      ..........................................................................................................................................  Patient Representative Print Name and Relationship to Patient    ..................................................               ................................................  Date                                   Time    ..........................................................................................................................................  Reviewed by Signature/Title    ...................................................              ..............................................  Date                                               Time          22EPIC Rev 08/18

## 2020-02-17 NOTE — ED PROVIDER NOTES
History     Chief Complaint   Patient presents with     Back Pain       Back Pain   Location:  Lumbar spine  Quality:  Shooting  Radiates to:  R posterior upper leg and R foot  Pain severity:  Severe  Pain is:  Same all the time  Onset quality:  Sudden  Duration:  3 weeks  Timing:  Constant  Progression:  Worsening  Chronicity:  New  Context: twisting    Context: not emotional stress, not falling, not jumping from heights, not lifting heavy objects, not MCA, not MVA, not occupational injury, not pedestrian accident, not physical stress, not recent illness and not recent injury    Relieved by:  Nothing  Worsened by:  Ambulation, twisting, standing, movement and bending  Ineffective treatments:  Being still, bed rest, lying down and OTC medications  Associated symptoms: leg pain    Associated symptoms: no abdominal pain, no bladder incontinence, no bowel incontinence, no chest pain, no dysuria, no fever, no headaches, no numbness, no paresthesias, no pelvic pain, no perianal numbness, no tingling, no weakness and no weight loss    Risk factors: lack of exercise and menopause    Risk factors: no hx of cancer, no hx of osteoporosis, not obese, not pregnant, no recent surgery, no steroid use and no vascular disease      Demetra Chang is a 64 year old female who presents to ER for concern of back pain with radiculopathy .Patient states that her symptoms started while bent over washing her hair in sink at the end of January . Patient had never had any back problems prior to that . Patient states symptoms waxed and waned but symptoms now back to how severe they were in beginning. Patient has been seen in clinic , [facility] and chiropractor so far without significant improvement. No issues with bowel or bladder. NO weakness in her legs. Difficulty walking secondary to the pain . NO fever , chills or systemic symptoms. NO history of cancer . No symptoms to suggest cauda equina       Allergies:  No Known  Allergies    Problem List:    Patient Active Problem List    Diagnosis Date Noted     Personal history of other mental disorder 03/09/2018     Priority: Medium     HPV (human papilloma virus) infection 04/04/2017     Priority: Medium     Overview:   4/17; cotesting in yr       Visit for suture removal 09/08/2016     Priority: Medium     Skin lesion 09/01/2016     Priority: Medium     Seborrheic keratosis 08/26/2016     Priority: Medium     Migraine without aura and without status migrainosus, not intractable 04/28/2016     Priority: Medium     Osteopenia, senile 02/09/2016     Priority: Medium     Overview:   dexa 2013, slightly worse Dexa 9/4/18       Anxiety 02/03/2016     Priority: Medium     Moderate episode of recurrent major depressive disorder (H) 02/03/2016     Priority: Medium     Narcolepsy 09/02/2014     Priority: Medium     Detrusor sphincter dyssynergia 08/27/2013     Priority: Medium     H/O: duodenal ulcer 12/13/2012     Priority: Medium     Overview:   2005 seen on EGD       Incomplete bladder emptying 12/13/2012     Priority: Medium     Overview:   Detrusor sphincter dyssnergia. 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     Priority: Medium     Overview:   IMO Update 10/11          Past Medical History:    Past Medical History:   Diagnosis Date     Personal history of other diseases of the digestive system (CODE)      Personal history of other medical treatment (CODE)        Past Surgical History:    Past Surgical History:   Procedure Laterality Date     CLOSED REDUCTION ANKLE      1999,Right ankle ORIF with hardware removal 2014.     COLONOSCOPY  11/16/2016    Follow up in 10 years per Dr. Alea Cruz      "ENDOSCOPIC SINUS SURGERY      1990s       Family History:    Family History   Problem Relation Age of Onset     Other - See Comments Mother         Lung cancer     Other - See Comments Father         Cancer - ? GI     Family History Negative Sister         Good Health     Family History Negative Sister         Good Health       Social History:  Marital Status:   [2]  Social History     Tobacco Use     Smoking status: Never Smoker     Smokeless tobacco: Never Used   Substance Use Topics     Alcohol use: No     Alcohol/week: 0.0 standard drinks     Drug use: Never     Comment: Drug use: No        Medications:    amphetamine-dextroamphetamine (ADDERALL XR) 30 MG per 24 hr capsule  ARIPiprazole (ABILIFY) 10 MG tablet  aspirin 81 MG tablet  Calcium Carbonate (CALCIUM 600 PO)  Cholecalciferol (VITAMIN D3) 1000 UNITS CAPS  cyanocobalamin (VITAMIN  B-12) 1000 MCG tablet  doxazosin (CARDURA) 1 MG tablet  FLUoxetine (PROZAC) 40 MG capsule  methimazole (TAPAZOLE) 5 MG tablet  Multiple Vitamin (MULTI-VITAMIN PO)  topiramate (TOPAMAX) 100 MG tablet  Venlafaxine HCl (EFFEXOR XR PO)  dextromethorphan-guaiFENesin (MUCINEX DM)  MG 12 hr tablet          Review of Systems   Constitutional: Negative for fever and weight loss.   Cardiovascular: Negative for chest pain.   Gastrointestinal: Negative for abdominal pain and bowel incontinence.   Genitourinary: Negative for bladder incontinence, dysuria and pelvic pain.   Musculoskeletal: Positive for back pain.   Neurological: Negative for tingling, weakness, numbness, headaches and paresthesias.       Physical Exam   BP: 120/69  Heart Rate: 99  Temp: 97.8  F (36.6  C)  Resp: 16  Height: 162.6 cm (5' 4\")  Weight: 80.7 kg (178 lb)  SpO2: 98 %      Physical Exam  Vitals signs and nursing note reviewed.   Constitutional:       Appearance: Normal appearance.   HENT:      Head: Normocephalic.      Nose: Nose normal.   Eyes:      Pupils: Pupils are equal, round, and reactive to " light.   Neck:      Musculoskeletal: Normal range of motion.   Cardiovascular:      Rate and Rhythm: Normal rate and regular rhythm.   Pulmonary:      Effort: Pulmonary effort is normal.      Breath sounds: Normal breath sounds.   Abdominal:      General: Abdomen is flat.      Palpations: Abdomen is soft. There is no mass.      Tenderness: There is no abdominal tenderness. There is no right CVA tenderness or left CVA tenderness.   Musculoskeletal:      Comments: Decreased rom of lower back . Positive straight leg raise at 30 degrees on left side NO weakness of lower extremities. NOrmal tone and reflexes    Skin:     General: Skin is warm.   Neurological:      Mental Status: She is alert. Mental status is at baseline.   Psychiatric:         Mood and Affect: Mood normal.         ED Course        Procedures          Patient presents to ER with concern of several week history of lower back pain which has been worsening. Patient has been seen by primary care as well as chiropractor and symptoms are still increasing . She will be seeing her primary care provider tomorrow in followup whom she states is planning to schedule an MRI but she has run out of her tramadol which she states just wasn't helping the pain anymore anyway. Patient triaged to exam room . Vitals signs reviewed. Exam suggestive of lumbar radiculopathy . UA done. UTI present. XRay done to rule out compressive fracture or mass which shows Degenerative changes of facet joints only which is consistent with presenting symptoms . Discussed finding with patient . Patient given 1 norco in ER with marked symptom relief. Patient discharged from ER for prescription for small quantity of norco as well as macrobid UTI. She will followup with her primary care doctor tomorrow for futher recommendations regarding further management of her back pain         Results for orders placed or performed during the hospital encounter of 02/17/20   XR Lumbar Spine 2/3 Views      Status: None    Narrative    XR LUMBAR SPINE 2-3 VIEWS    HISTORY: low back pain .    TECHNIQUE: 3 views of the lumbosacral spine.    COMPARISON: None.    FINDINGS:    The bones are osteoporotic. There is levoscoliosis of the lumbar spine  associated with asymmetric degenerative changes. No acute fracture is  identified. Advanced facet degeneration is present at L4-5 and L5-S1.         Impression    IMPRESSION:     Osteoporosis. No evidence of acute fracture.      YUNG GUNN MD   UA reflex to Microscopic and Culture     Status: Abnormal   Result Value Ref Range    Color Urine Yellow     Appearance Urine Slightly Cloudy     Glucose Urine Negative NEG^Negative mg/dL    Bilirubin Urine Negative NEG^Negative    Ketones Urine 5 (A) NEG^Negative mg/dL    Specific Gravity Urine 1.025 1.003 - 1.035    Blood Urine Negative NEG^Negative    pH Urine 6.0 5.0 - 7.0 pH    Protein Albumin Urine 30 (A) NEG^Negative mg/dL    Urobilinogen mg/dL 2.0 0.0 - 2.0 mg/dL    Nitrite Urine Positive (A) NEG^Negative    Leukocyte Esterase Urine Large (A) NEG^Negative    Source Midstream Urine     RBC Urine 2 0 - 2 /HPF    WBC Urine 64 (H) 0 - 5 /HPF    WBC Clumps Present (A) NEG^Negative /HPF    Squamous Epithelial /HPF Urine 10 (H) 0 - 1 /HPF    Mucous Urine Present (A) NEG^Negative /LPF   CBC with platelets differential     Status: None   Result Value Ref Range    WBC 7.1 4.0 - 11.0 10e9/L    RBC Count 4.29 3.8 - 5.2 10e12/L    Hemoglobin 13.2 11.7 - 15.7 g/dL    Hematocrit 41.7 35.0 - 47.0 %    MCV 97 78 - 100 fl    MCH 30.8 26.5 - 33.0 pg    MCHC 31.7 31.5 - 36.5 g/dL    RDW 12.3 10.0 - 15.0 %    Platelet Count 324 150 - 450 10e9/L    Diff Method Automated Method     % Neutrophils 76.1 %    % Lymphocytes 13.9 %    % Monocytes 7.0 %    % Eosinophils 2.1 %    % Basophils 0.6 %    % Immature Granulocytes 0.3 %    Absolute Neutrophil 5.4 1.6 - 8.3 10e9/L    Absolute Lymphocytes 1.0 0.8 - 5.3 10e9/L    Absolute Monocytes 0.5 0.0 - 1.3  10e9/L    Absolute Eosinophils 0.2 0.0 - 0.7 10e9/L    Absolute Basophils 0.0 0.0 - 0.2 10e9/L    Abs Immature Granulocytes 0.0 0 - 0.4 10e9/L   Erythrocyte sedimentation rate auto     Status: Abnormal   Result Value Ref Range    Sed Rate 64 (H) 1 - 15 mm/h        No results found for this or any previous visit (from the past 24 hour(s)).    Medications - No data to display    Assessments & Plan (with Medical Decision Making)     I have reviewed the nursing notes.    I have reviewed the findings, diagnosis, plan and need for follow up with the patient.      New Prescriptions    No medications on file       Final diagnoses:   UTI (urinary tract infection) with pyuria   Back pain with radiculopathy   Facet degeneration of lumbar region       2/17/2020   [facility] Margei Cervantes MD  02/17/20 1018

## 2020-02-17 NOTE — ED TRIAGE NOTES
PT ARRIVES to the ED via private car.  PT states that she is has sciatica on her left side since January.  PT states she has been seeing a MD and chiropractor for this and gets better and then worse.  Pt states it is getting worse and has a hard time moving.  Pt was taking tylenol-3 and did not help.  Pt also given tramadol and that did help but is now out.

## 2020-02-17 NOTE — DISCHARGE INSTRUCTIONS
Schedule a follow up with your primary care doctor to discuss further management recommendations regarding your back pain . YOu may take tylenol or ibuprofen as needed for the pain . You may take norco as needed for severe pain but dont take more than a total of 4000mg of tylenol in 24 hours. Be sure to drink plenty of fluids Return to ER as needed for worsening or concerning symptoms

## 2020-02-18 ENCOUNTER — TELEPHONE (OUTPATIENT)
Dept: UROLOGY | Facility: OTHER | Age: 65
End: 2020-02-18

## 2020-02-18 DIAGNOSIS — N36.44 DETRUSOR SPHINCTER DYSSYNERGIA: ICD-10-CM

## 2020-02-18 RX ORDER — DOXAZOSIN 1 MG/1
1 TABLET ORAL EVERY MORNING
Qty: 90 TABLET | Refills: 3 | Status: CANCELLED | OUTPATIENT
Start: 2020-02-18

## 2020-02-18 RX ORDER — DOXAZOSIN 1 MG/1
TABLET ORAL
Qty: 90 TABLET | Refills: 0 | Status: SHIPPED | OUTPATIENT
Start: 2020-02-18 | End: 2020-07-01

## 2020-02-18 NOTE — TELEPHONE ENCOUNTER
Pt had a family emergency and had to cancel appt today.  She rescheduled for 3/12/20, can you please refill med at least so she has enough to get to her next appt?  Thank You  Donna

## 2020-02-19 LAB
BACTERIA SPEC CULT: ABNORMAL
SPECIMEN SOURCE: ABNORMAL

## 2020-02-19 NOTE — TELEPHONE ENCOUNTER
Left message for pt notifying her that her prescription has been filled and she can pick it up at her pharmacy.  BEREKET MORRIS, OLIVEN

## 2020-03-05 DIAGNOSIS — N36.44 DETRUSOR SPHINCTER DYSSYNERGIA: Primary | ICD-10-CM

## 2020-03-12 ENCOUNTER — OFFICE VISIT (OUTPATIENT)
Dept: UROLOGY | Facility: OTHER | Age: 65
End: 2020-03-12
Attending: UROLOGY
Payer: MEDICARE

## 2020-03-12 VITALS
TEMPERATURE: 97.5 F | SYSTOLIC BLOOD PRESSURE: 100 MMHG | OXYGEN SATURATION: 96 % | WEIGHT: 175 LBS | DIASTOLIC BLOOD PRESSURE: 70 MMHG | HEART RATE: 100 BPM | BODY MASS INDEX: 29.88 KG/M2 | HEIGHT: 64 IN

## 2020-03-12 DIAGNOSIS — N36.44 DETRUSOR SPHINCTER DYSSYNERGIA: Primary | ICD-10-CM

## 2020-03-12 PROCEDURE — 51798 US URINE CAPACITY MEASURE: CPT

## 2020-03-12 PROCEDURE — G0463 HOSPITAL OUTPT CLINIC VISIT: HCPCS

## 2020-03-12 PROCEDURE — G0463 HOSPITAL OUTPT CLINIC VISIT: HCPCS | Mod: 25

## 2020-03-12 PROCEDURE — 99213 OFFICE O/P EST LOW 20 MIN: CPT | Performed by: UROLOGY

## 2020-03-12 RX ORDER — DOXAZOSIN 4 MG/1
4 TABLET ORAL AT BEDTIME
Qty: 90 TABLET | Refills: 3 | Status: SHIPPED | OUTPATIENT
Start: 2020-03-12 | End: 2020-04-28 | Stop reason: DRUGHIGH

## 2020-03-12 ASSESSMENT — ENCOUNTER SYMPTOMS: BACK PAIN: 1

## 2020-03-12 ASSESSMENT — MIFFLIN-ST. JEOR: SCORE: 1328.79

## 2020-03-12 ASSESSMENT — PAIN SCALES - GENERAL: PAINLEVEL: NO PAIN (0)

## 2020-03-12 NOTE — PROGRESS NOTES
History     Chief Complaint:    Consult For (NPT  DSD  (former Dr Wilcox Pt))      HPI   Demetra Chang is a 64 year old female who presents with history of DSD.  Demetra was diagnosed around 2005 when she went into urinary retention.  She has been treated with Cardura 4 mg and doing well with that.  She tells me she is taking it more in the morning than at night but is tolerating that fine.  She is complaining of some incontinence.  She leaks if she has a very large sneeze or if she lifts a heavy garage door.  She says she does not leak every day and she is not necessarily wearing a pad.  She also has some problems with leakage at night but that is more due to urgency and urge incontinence.  She did have a recent urinary tract infection which was treated in [place] but she does not have a history of having a lot of infections her last one was 2017.  It is completely asymptomatic from that infection in February.    Allergies:    No Known Allergies     Medications:      amphetamine-dextroamphetamine (ADDERALL XR) 30 MG per 24 hr capsule  ARIPiprazole (ABILIFY) 10 MG tablet  aspirin 81 MG tablet  Calcium Carbonate (CALCIUM 600 PO)  Cholecalciferol (VITAMIN D3) 1000 UNITS CAPS  cyanocobalamin (VITAMIN  B-12) 1000 MCG tablet  doxazosin (CARDURA) 4 MG tablet  DOXAZOSIN 1 MG PO tablet  FLUoxetine (PROZAC) 40 MG capsule  methimazole (TAPAZOLE) 5 MG tablet  Multiple Vitamin (MULTI-VITAMIN PO)  topiramate (TOPAMAX) 100 MG tablet  Venlafaxine HCl (EFFEXOR XR PO)        Problem List:      Patient Active Problem List    Diagnosis Date Noted     Personal history of other mental disorder 03/09/2018     Priority: Medium     HPV (human papilloma virus) infection 04/04/2017     Priority: Medium     Overview:   4/17; cotesting in yr       Visit for suture removal 09/08/2016     Priority: Medium     Skin lesion 09/01/2016     Priority: Medium     Seborrheic keratosis 08/26/2016     Priority: Medium     Migraine without aura and  without status migrainosus, not intractable 04/28/2016     Priority: Medium     Osteopenia, senile 02/09/2016     Priority: Medium     Overview:   dexa 2013, slightly worse Dexa 9/4/18       Anxiety 02/03/2016     Priority: Medium     Moderate episode of recurrent major depressive disorder (H) 02/03/2016     Priority: Medium     Narcolepsy 09/02/2014     Priority: Medium     Detrusor sphincter dyssynergia 08/27/2013     Priority: Medium     H/O: duodenal ulcer 12/13/2012     Priority: Medium     Overview:   2005 seen on EGD       Incomplete bladder emptying 12/13/2012     Priority: Medium     Overview:   Detrusor sphincter dyssnergia. 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     Priority: Medium     Overview:   IMO Update 10/11          Past Medical History:      Past Medical History:   Diagnosis Date     Personal history of other diseases of the digestive system (CODE)      Personal history of other medical treatment (CODE)        Past Surgical History:      Past Surgical History:   Procedure Laterality Date     CLOSED REDUCTION ANKLE      1999,Right ankle ORIF with hardware removal 2014.     COLONOSCOPY  11/16/2016    Follow up in 10 years per Dr. Cosby [facility]     ENDOSCOPIC SINUS SURGERY      1990s       Family History:      Family History   Problem Relation Age of Onset     Other - See Comments Mother         Lung cancer     Other - See Comments Father         Cancer - ? GI     Family History Negative Sister         Good Health     Family History Negative Sister         Good Health       Social History:    Marital Status:   [2]  Social History     Tobacco Use     Smoking status: Never Smoker     Smokeless tobacco:  "Never Used   Substance Use Topics     Alcohol use: No     Alcohol/week: 0.0 standard drinks     Drug use: Never     Comment: Drug use: No        Review of Systems   Musculoskeletal: Positive for back pain.   All other systems reviewed and are negative.        Physical Exam   Vitals:  /70   Pulse 100   Temp 97.5  F (36.4  C) (Tympanic)   Ht 1.626 m (5' 4\")   Wt 79.4 kg (175 lb)   SpO2 96%   BMI 30.04 kg/m        Physical Exam  Constitutional:       Appearance: Normal appearance. She is normal weight.   Cardiovascular:      Rate and Rhythm: Normal rate.   Pulmonary:      Effort: Pulmonary effort is normal.   Abdominal:      General: Abdomen is flat. Bowel sounds are normal.      Palpations: Abdomen is soft. There is no mass.      Hernia: No hernia is present. There is no hernia in the left inguinal area or right inguinal area.   Genitourinary:     Exam position: Lithotomy position.      Labia:         Right: No rash or tenderness.         Left: No rash or tenderness.       Urethra: No prolapse or urethral pain.      Comments: External genitalia are normal.  Patient does have urethral descensus but I cannot get her to leak.  She cannot isolate her pelvic floor at all to do a pelvic floor contraction.  Vaginal masses or obvious pelvic masses.  External rectal area is normal normal rectal tone and no rectal masses.  Neurological:      Mental Status: She is alert.       Bladder scan: 0  Impression: #1 history of DSD well managed #2 mixed incontinence    Plan   Plan: Demetra seems to be managing her DSD well with the Cardura so I refilled that for a year.  She does have some stress incontinence but this would be the type of patient you would not want to do any kind of bladder outlet procedure on because of her history of DSD.  You could very well put this patient into urinary retention with a sling.  I would be hesitant to do anything and I do not feel her incontinence is bad enough to warrant that.  She also has " some urge incontinence primarily at night when she cannot get to the toilet.  I think the best way to manage both of these is with physical therapy and will avoid medications at this time.  The Cardura may be exacerbating her stress incontinence but she has been on it for a long time and has managed her DSD well so I am hesitant to stop it.  Follow-up in 1 year      No follow-ups on file.    Aspen Pollock MD  [facility]

## 2020-03-12 NOTE — NURSING NOTE
"Chief Complaint   Patient presents with     Consult For     NPT  DSD  (former Dr Wilcox Pt)       Initial /70   Pulse 100   Temp 97.5  F (36.4  C) (Tympanic)   Ht 1.626 m (5' 4\")   Wt 79.4 kg (175 lb)   SpO2 96%   BMI 30.04 kg/m   Estimated body mass index is 30.04 kg/m  as calculated from the following:    Height as of this encounter: 1.626 m (5' 4\").    Weight as of this encounter: 79.4 kg (175 lb).  Medication Reconciliation: complete  Talita Reynaga LPN  "

## 2020-03-12 NOTE — LETTER
3/12/2020       RE: Demetra Chang  [address]  [place] [place] [place] [address]     Dear Colleague,    Thank you for referring your patient, Demetra Chang, to the [facility] at [facility]. Please see a copy of my visit note below.      History     Chief Complaint:    Consult For (NPT  DSD  (former Dr Wilcox Pt))      HPI   Demetra Chang is a 64 year old female who presents with history of DSD.  Demetra was diagnosed around 2005 when she went into urinary retention.  She has been treated with Cardura 4 mg and doing well with that.  She tells me she is taking it more in the morning than at night but is tolerating that fine.  She is complaining of some incontinence.  She leaks if she has a very large sneeze or if she lifts a heavy garage door.  She says she does not leak every day and she is not necessarily wearing a pad.  She also has some problems with leakage at night but that is more due to urgency and urge incontinence.  She did have a recent urinary tract infection which was treated in [place] but she does not have a history of having a lot of infections her last one was 2017.  It is completely asymptomatic from that infection in February.    Allergies:    No Known Allergies     Medications:      amphetamine-dextroamphetamine (ADDERALL XR) 30 MG per 24 hr capsule  ARIPiprazole (ABILIFY) 10 MG tablet  aspirin 81 MG tablet  Calcium Carbonate (CALCIUM 600 PO)  Cholecalciferol (VITAMIN D3) 1000 UNITS CAPS  cyanocobalamin (VITAMIN  B-12) 1000 MCG tablet  doxazosin (CARDURA) 4 MG tablet  DOXAZOSIN 1 MG PO tablet  FLUoxetine (PROZAC) 40 MG capsule  methimazole (TAPAZOLE) 5 MG tablet  Multiple Vitamin (MULTI-VITAMIN PO)  topiramate (TOPAMAX) 100 MG tablet  Venlafaxine HCl (EFFEXOR XR PO)        Problem List:      Patient Active Problem List    Diagnosis Date Noted     Personal history of other mental disorder 03/09/2018     Priority: Medium     HPV  (human papilloma virus) infection 04/04/2017     Priority: Medium     Overview:   4/17; cotesting in yr       Visit for suture removal 09/08/2016     Priority: Medium     Skin lesion 09/01/2016     Priority: Medium     Seborrheic keratosis 08/26/2016     Priority: Medium     Migraine without aura and without status migrainosus, not intractable 04/28/2016     Priority: Medium     Osteopenia, senile 02/09/2016     Priority: Medium     Overview:   dexa 2013, slightly worse Dexa 9/4/18       Anxiety 02/03/2016     Priority: Medium     Moderate episode of recurrent major depressive disorder (H) 02/03/2016     Priority: Medium     Narcolepsy 09/02/2014     Priority: Medium     Detrusor sphincter dyssynergia 08/27/2013     Priority: Medium     H/O: duodenal ulcer 12/13/2012     Priority: Medium     Overview:   2005 seen on EGD       Incomplete bladder emptying 12/13/2012     Priority: Medium     Overview:   Detrusor sphincter dyssnergia. 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     Priority: Medium     Overview:   IMO Update 10/11          Past Medical History:      Past Medical History:   Diagnosis Date     Personal history of other diseases of the digestive system (CODE)      Personal history of other medical treatment (CODE)        Past Surgical History:      Past Surgical History:   Procedure Laterality Date     CLOSED REDUCTION ANKLE      1999,Right ankle ORIF with hardware removal 2014.     COLONOSCOPY  11/16/2016    Follow up in 10 years per Dr. Cosby [facility]     ENDOSCOPIC SINUS SURGERY      1990s       Family History:      Family History   Problem Relation Age of Onset     Other - See Comments Mother         Lung cancer      "Other - See Comments Father         Cancer - ? GI     Family History Negative Sister         Good Health     Family History Negative Sister         Good Health       Social History:    Marital Status:   [2]  Social History     Tobacco Use     Smoking status: Never Smoker     Smokeless tobacco: Never Used   Substance Use Topics     Alcohol use: No     Alcohol/week: 0.0 standard drinks     Drug use: Never     Comment: Drug use: No        Review of Systems   Musculoskeletal: Positive for back pain.   All other systems reviewed and are negative.        Physical Exam   Vitals:  /70   Pulse 100   Temp 97.5  F (36.4  C) (Tympanic)   Ht 1.626 m (5' 4\")   Wt 79.4 kg (175 lb)   SpO2 96%   BMI 30.04 kg/m        Physical Exam  Constitutional:       Appearance: Normal appearance. She is normal weight.   Cardiovascular:      Rate and Rhythm: Normal rate.   Pulmonary:      Effort: Pulmonary effort is normal.   Abdominal:      General: Abdomen is flat. Bowel sounds are normal.      Palpations: Abdomen is soft. There is no mass.      Hernia: No hernia is present. There is no hernia in the left inguinal area or right inguinal area.   Genitourinary:     Exam position: Lithotomy position.      Labia:         Right: No rash or tenderness.         Left: No rash or tenderness.       Urethra: No prolapse or urethral pain.      Comments: External genitalia are normal.  Patient does have urethral descensus but I cannot get her to leak.  She cannot isolate her pelvic floor at all to do a pelvic floor contraction.  Vaginal masses or obvious pelvic masses.  External rectal area is normal normal rectal tone and no rectal masses.  Neurological:      Mental Status: She is alert.       Bladder scan: 0  Impression: #1 history of DSD well managed #2 mixed incontinence    Plan   Plan: Demetra seems to be managing her DSD well with the Cardura so I refilled that for a year.  She does have some stress incontinence but this would be the " type of patient you would not want to do any kind of bladder outlet procedure on because of her history of DSD.  You could very well put this patient into urinary retention with a sling.  I would be hesitant to do anything and I do not feel her incontinence is bad enough to warrant that.  She also has some urge incontinence primarily at night when she cannot get to the toilet.  I think the best way to manage both of these is with physical therapy and will avoid medications at this time.  The Cardura may be exacerbating her stress incontinence but she has been on it for a long time and has managed her DSD well so I am hesitant to stop it.  Follow-up in 1 year      No follow-ups on file.    Aspen Pollock MD  [facility] [place]            Again, thank you for allowing me to participate in the care of your patient.      Sincerely,    Aspen Pollock MD

## 2020-04-27 ENCOUNTER — TELEPHONE (OUTPATIENT)
Dept: UROLOGY | Facility: OTHER | Age: 65
End: 2020-04-27

## 2020-04-27 DIAGNOSIS — N36.44 DETRUSOR SPHINCTER DYSSYNERGIA: Primary | ICD-10-CM

## 2020-04-27 RX ORDER — DOXAZOSIN 1 MG/1
1 TABLET ORAL AT BEDTIME
Qty: 90 TABLET | Refills: 3 | Status: CANCELLED | OUTPATIENT
Start: 2020-04-27

## 2020-04-27 NOTE — TELEPHONE ENCOUNTER
Pt called stating when she picked up her prescription the dose was incorrect.  Pt takes 1 mg daily and prescription was sent in as 4 mg.  Please sign correct order pending.  Thank You  Donna

## 2020-04-28 DIAGNOSIS — N36.44 DETRUSOR SPHINCTER DYSSYNERGIA: Primary | ICD-10-CM

## 2020-04-28 RX ORDER — DOXAZOSIN 1 MG/1
1 TABLET ORAL AT BEDTIME
Qty: 90 TABLET | Refills: 3 | Status: SHIPPED | OUTPATIENT
Start: 2020-04-28 | End: 2021-05-12

## 2020-04-28 NOTE — TELEPHONE ENCOUNTER
Donna, please let the pt know that 1mg dose script was sent. In her chart 4 mg is listed in her med list.  Can you please correct that  Thanks nancy

## 2020-05-15 DIAGNOSIS — N39.41 URGE INCONTINENCE OF URINE: ICD-10-CM

## 2020-05-15 DIAGNOSIS — N36.44 DSD (DETRUSOR AND SPHINCTER DYSSYNERGIA): Primary | ICD-10-CM

## 2020-07-01 ENCOUNTER — OFFICE VISIT (OUTPATIENT)
Dept: FAMILY MEDICINE | Facility: OTHER | Age: 65
End: 2020-07-01
Attending: FAMILY MEDICINE
Payer: MEDICARE

## 2020-07-01 VITALS
WEIGHT: 173.4 LBS | TEMPERATURE: 97 F | OXYGEN SATURATION: 97 % | HEIGHT: 64 IN | HEART RATE: 120 BPM | BODY MASS INDEX: 29.6 KG/M2 | SYSTOLIC BLOOD PRESSURE: 120 MMHG | DIASTOLIC BLOOD PRESSURE: 70 MMHG | RESPIRATION RATE: 16 BRPM

## 2020-07-01 DIAGNOSIS — N30.00 ACUTE CYSTITIS WITHOUT HEMATURIA: Primary | ICD-10-CM

## 2020-07-01 LAB
ALBUMIN UR-MCNC: NEGATIVE MG/DL
APPEARANCE UR: CLEAR
BACTERIA #/AREA URNS HPF: ABNORMAL /HPF
BILIRUB UR QL STRIP: NEGATIVE
COLOR UR AUTO: YELLOW
GLUCOSE UR STRIP-MCNC: NEGATIVE MG/DL
HGB UR QL STRIP: NEGATIVE
KETONES UR STRIP-MCNC: NEGATIVE MG/DL
LEUKOCYTE ESTERASE UR QL STRIP: ABNORMAL
MUCOUS THREADS #/AREA URNS LPF: PRESENT /LPF
NITRATE UR QL: POSITIVE
PH UR STRIP: 5.5 PH (ref 5–7)
RBC #/AREA URNS AUTO: 1 /HPF (ref 0–2)
SOURCE: ABNORMAL
SP GR UR STRIP: 1.02 (ref 1–1.03)
SQUAMOUS #/AREA URNS AUTO: 1 /HPF (ref 0–1)
UROBILINOGEN UR STRIP-MCNC: NORMAL MG/DL (ref 0–2)
WBC #/AREA URNS AUTO: 4 /HPF (ref 0–5)

## 2020-07-01 PROCEDURE — G0463 HOSPITAL OUTPT CLINIC VISIT: HCPCS

## 2020-07-01 PROCEDURE — 81001 URINALYSIS AUTO W/SCOPE: CPT | Mod: ZL | Performed by: FAMILY MEDICINE

## 2020-07-01 PROCEDURE — 99213 OFFICE O/P EST LOW 20 MIN: CPT | Performed by: FAMILY MEDICINE

## 2020-07-01 PROCEDURE — 87088 URINE BACTERIA CULTURE: CPT | Mod: ZL | Performed by: FAMILY MEDICINE

## 2020-07-01 PROCEDURE — 87086 URINE CULTURE/COLONY COUNT: CPT | Mod: ZL | Performed by: FAMILY MEDICINE

## 2020-07-01 RX ORDER — DOXAZOSIN 4 MG/1
1 TABLET ORAL DAILY
COMMUNITY
Start: 2020-06-08 | End: 2020-07-01

## 2020-07-01 RX ORDER — VENLAFAXINE HYDROCHLORIDE 75 MG/1
1 CAPSULE, EXTENDED RELEASE ORAL DAILY
COMMUNITY
Start: 2020-04-02

## 2020-07-01 ASSESSMENT — ENCOUNTER SYMPTOMS
BACK PAIN: 0
VOMITING: 0
FEVER: 0
FREQUENCY: 0
CHILLS: 0
DYSURIA: 0
ABDOMINAL PAIN: 0
NAUSEA: 0

## 2020-07-01 ASSESSMENT — MIFFLIN-ST. JEOR: SCORE: 1321.54

## 2020-07-01 ASSESSMENT — PAIN SCALES - GENERAL: PAINLEVEL: NO PAIN (0)

## 2020-07-01 NOTE — PROGRESS NOTES
SUBJECTIVE:   Demetra Chang is a 64 year old female who presents to clinic today for the following health issues:    HPI  Foul-Smelling Urine:  Symptoms began on Sunday.  Reports that urine is dark yellow and foul-smelling.  Denies any other symptoms.  However, reports that with last urinary tract infection, she did not have any symptoms either.    Past Medical History:   Diagnosis Date     Anxiety 2/3/2016     Bipolar affective disorder (H) 9/20/2011     Detrusor sphincter dyssynergia 8/27/2013     Disorder of upper respiratory system 2/9/2012     H/O: duodenal ulcer 12/13/2012    Overview:  2005 seen on EGD     HPV (human papilloma virus) infection 4/4/2017    Overview:  4/17; cotesting in yr     Migraine without aura and without status migrainosus, not intractable 4/28/2016     Moderate episode of recurrent major depressive disorder (H) 2/3/2016     Osteopenia, senile 2/9/2016    Overview:  dexa 2013, slightly worse Dexa 9/4/18     Pain in joint, ankle and foot 5/12/2010    Overview:  IMO Update 10/11     Personal history of other diseases of the digestive system (CODE)     PUD - Hospitalized ~ 2004     Personal history of other medical treatment (CODE)     Three     Seborrheic keratosis 8/26/2016      Past Surgical History:   Procedure Laterality Date     CLOSED REDUCTION ANKLE Right 1999     ORIF with hardware removal 2014.     COLONOSCOPY  11/16/2016    Follow up in 10 years per Dr. Cosby [facility]     ENDOSCOPIC SINUS SURGERY  1990 1990s     Family History   Problem Relation Age of Onset     Other - See Comments Mother         Lung cancer     Other - See Comments Father         Cancer - ? GI     Family History Negative Sister         Good Health     Family History Negative Sister         Good Health     Social History     Tobacco Use     Smoking status: Never Smoker     Smokeless tobacco: Never Used   Substance Use Topics     Alcohol use: No     Alcohol/week: 0.0 standard drinks     Current  "Outpatient Medications   Medication Sig Dispense Refill     amoxicillin-clavulanate (AUGMENTIN) 875-125 MG tablet Take 1 tablet by mouth 2 times daily for 10 days 20 tablet 0     amphetamine-dextroamphetamine (ADDERALL XR) 30 MG per 24 hr capsule Take 1 tablet by mouth 2 times daily       ARIPiprazole (ABILIFY) 10 MG tablet Take 10 mg by mouth       aspirin 81 MG tablet Take 81 mg by mouth daily       Calcium Carbonate (CALCIUM 600 PO) Take 500 mg by mouth 2 times daily        Cholecalciferol (VITAMIN D3) 1000 UNITS CAPS Take 1,000 Units by mouth daily       cyanocobalamin (VITAMIN  B-12) 1000 MCG tablet Take 500 mcg by mouth daily        doxazosin (CARDURA) 1 MG tablet Take 1 tablet (1 mg) by mouth At Bedtime 90 tablet 3     FLUoxetine (PROZAC) 40 MG capsule Take 40 mg by mouth 2 times daily       methimazole (TAPAZOLE) 5 MG tablet Take 5 mg by mouth daily       Multiple Vitamin (MULTI-VITAMIN PO) Take by mouth daily       topiramate (TOPAMAX) 100 MG tablet Take 100 mg by mouth 2 times daily        venlafaxine (EFFEXOR-XR) 75 MG 24 hr capsule Take 1 capsule by mouth daily       No Known Allergies    Review of Systems   Constitutional: Negative for chills and fever.   Gastrointestinal: Negative for abdominal pain, nausea and vomiting.   Genitourinary: Negative for dysuria, frequency and urgency.   Musculoskeletal: Negative for back pain.      OBJECTIVE:     /70   Pulse 120   Temp 97  F (36.1  C) (Temporal)   Resp 16   Ht 1.626 m (5' 4\")   Wt 78.7 kg (173 lb 6.4 oz)   SpO2 97%   BMI 29.76 kg/m    Body mass index is 29.76 kg/m .  Physical Exam  Constitutional:       General: She is not in acute distress.     Appearance: Normal appearance. She is not ill-appearing.   Cardiovascular:      Rate and Rhythm: Regular rhythm. Tachycardia present.   Pulmonary:      Effort: Pulmonary effort is normal.      Breath sounds: Normal breath sounds.   Abdominal:      General: Bowel sounds are normal.      Palpations: " Abdomen is soft.      Tenderness: There is no abdominal tenderness. There is no right CVA tenderness, left CVA tenderness, guarding or rebound.   Neurological:      Mental Status: She is alert.   Psychiatric:         Mood and Affect: Mood normal.     Diagnostic Test Results:  Labs reviewed in Epic  Results for orders placed or performed in visit on 07/01/20 (from the past 24 hour(s))   UA reflex to Microscopic and Culture    Specimen: Midstream Urine   Result Value Ref Range    Color Urine Yellow     Appearance Urine Clear     Glucose Urine Negative NEG^Negative mg/dL    Bilirubin Urine Negative NEG^Negative    Ketones Urine Negative NEG^Negative mg/dL    Specific Gravity Urine 1.018 1.003 - 1.035    Blood Urine Negative NEG^Negative    pH Urine 5.5 5.0 - 7.0 pH    Protein Albumin Urine Negative NEG^Negative mg/dL    Urobilinogen mg/dL Normal 0.0 - 2.0 mg/dL    Nitrite Urine Positive (A) NEG^Negative    Leukocyte Esterase Urine Small (A) NEG^Negative    Source Midstream Urine     RBC Urine 1 0 - 2 /HPF    WBC Urine 4 0 - 5 /HPF    Bacteria Urine Many (A) NEG^Negative /HPF    Squamous Epithelial /HPF Urine 1 0 - 1 /HPF    Mucous Urine Present (A) NEG^Negative /LPF     ASSESSMENT/PLAN:     1. Acute cystitis without hematuria  UA suggestive of infection with positive nitrites and small leukocyte esterase.  Previous urine culture result reviewed.  Start treatment with Augmentin x 10 days.  Follow-up urine culture results and adjust antibiotic if indicated.  - UA reflex to Microscopic and Culture  - Urine Culture Aerobic Bacterial  - amoxicillin-clavulanate (AUGMENTIN) 875-125 MG tablet; Take 1 tablet by mouth 2 times daily for 10 days  Dispense: 20 tablet; Refill: 0      DO OSKAR Arreaga [facility] AND [facility]

## 2020-07-01 NOTE — NURSING NOTE
"Chief Complaint   Patient presents with     Dysuria         Initial /70   Pulse 120   Temp 97  F (36.1  C) (Temporal)   Resp 16   Ht 1.626 m (5' 4\")   Wt 78.7 kg (173 lb 6.4 oz)   SpO2 97%   BMI 29.76 kg/m   Estimated body mass index is 29.76 kg/m  as calculated from the following:    Height as of this encounter: 1.626 m (5' 4\").    Weight as of this encounter: 78.7 kg (173 lb 6.4 oz).    Medication Reconciliation: complete      Norma J. Gosselin, LPN  "

## 2020-07-03 LAB
BACTERIA SPEC CULT: ABNORMAL
SPECIMEN SOURCE: ABNORMAL

## 2020-08-04 ENCOUNTER — TRANSFERRED RECORDS (OUTPATIENT)
Dept: OTHER | Facility: HOSPITAL | Age: 65
End: 2020-08-04

## 2021-05-11 ENCOUNTER — TELEPHONE (OUTPATIENT)
Dept: UROLOGY | Facility: OTHER | Age: 66
End: 2021-05-11

## 2021-05-11 NOTE — TELEPHONE ENCOUNTER
10:47 AM    Reason for Call: Phone Call    Description: Patient called having questions regarding her prescription from Dr. Pollock. Patient would like call back please      Was an appointment offered for this call? No  If yes : Appointment type              Date    Preferred method for responding to this message: Telephone Call  What is your phone number ? [phone]    If we cannot reach you directly, may we leave a detailed response at the number you provided? Yes    Can this message wait until your PCP/provider returns, if available today? YES    Susan Patton

## 2021-05-12 DIAGNOSIS — N36.44 DETRUSOR SPHINCTER DYSSYNERGIA: ICD-10-CM

## 2021-05-12 RX ORDER — DOXAZOSIN 1 MG/1
1 TABLET ORAL AT BEDTIME
Qty: 90 TABLET | Refills: 3 | Status: SHIPPED | OUTPATIENT
Start: 2021-05-12

## 2021-10-12 ENCOUNTER — IMMUNIZATION (OUTPATIENT)
Dept: FAMILY MEDICINE | Facility: OTHER | Age: 66
End: 2021-10-12
Attending: FAMILY MEDICINE
Payer: MEDICARE

## 2021-10-12 PROCEDURE — 91300 PR COVID VAC PFIZER DIL RECON 30 MCG/0.3 ML IM: CPT

## 2021-11-02 ENCOUNTER — IMMUNIZATION (OUTPATIENT)
Dept: FAMILY MEDICINE | Facility: OTHER | Age: 66
End: 2021-11-02
Attending: FAMILY MEDICINE
Payer: MEDICARE

## 2021-11-02 PROCEDURE — 91300 PR COVID VAC PFIZER DIL RECON 30 MCG/0.3 ML IM: CPT

## 2022-06-28 ENCOUNTER — TRANSFERRED RECORDS (OUTPATIENT)
Dept: MULTI SPECIALTY CLINIC | Facility: CLINIC | Age: 67
End: 2022-06-28

## 2022-08-18 ENCOUNTER — OFFICE VISIT (OUTPATIENT)
Dept: FAMILY MEDICINE | Facility: OTHER | Age: 67
End: 2022-08-18
Attending: FAMILY MEDICINE
Payer: MEDICARE

## 2022-08-18 VITALS
SYSTOLIC BLOOD PRESSURE: 128 MMHG | BODY MASS INDEX: 31.53 KG/M2 | HEART RATE: 112 BPM | DIASTOLIC BLOOD PRESSURE: 80 MMHG | OXYGEN SATURATION: 96 % | RESPIRATION RATE: 18 BRPM | TEMPERATURE: 97.8 F | WEIGHT: 183.7 LBS

## 2022-08-18 DIAGNOSIS — J40 BRONCHITIS: ICD-10-CM

## 2022-08-18 DIAGNOSIS — H10.33 ACUTE CONJUNCTIVITIS OF BOTH EYES, UNSPECIFIED ACUTE CONJUNCTIVITIS TYPE: Primary | ICD-10-CM

## 2022-08-18 PROCEDURE — 99213 OFFICE O/P EST LOW 20 MIN: CPT | Performed by: FAMILY MEDICINE

## 2022-08-18 PROCEDURE — G0463 HOSPITAL OUTPT CLINIC VISIT: HCPCS

## 2022-08-18 RX ORDER — PANTOPRAZOLE SODIUM 40 MG/1
TABLET, DELAYED RELEASE ORAL
COMMUNITY
Start: 2022-01-28

## 2022-08-18 RX ORDER — AZITHROMYCIN 250 MG/1
TABLET, FILM COATED ORAL
Qty: 6 TABLET | Refills: 0 | Status: SHIPPED | OUTPATIENT
Start: 2022-08-18 | End: 2022-08-23

## 2022-08-18 RX ORDER — POLYMYXIN B SULFATE AND TRIMETHOPRIM 1; 10000 MG/ML; [USP'U]/ML
1-2 SOLUTION OPHTHALMIC EVERY 4 HOURS
Qty: 10 ML | Refills: 0 | Status: SHIPPED | OUTPATIENT
Start: 2022-08-18

## 2022-08-18 RX ORDER — VENLAFAXINE HYDROCHLORIDE 150 MG/1
150 CAPSULE, EXTENDED RELEASE ORAL DAILY
COMMUNITY
Start: 2022-07-08

## 2022-08-18 NOTE — PATIENT INSTRUCTIONS
Take prescribed medication and drops as directed.    Have a follow up check if not improving.

## 2022-08-18 NOTE — PROGRESS NOTES
(H10.33) Acute conjunctivitis of both eyes, unspecified acute conjunctivitis type  (primary encounter diagnosis)  Comment:   Plan: trimethoprim-polymyxin b (POLYTRIM) 33238-8.1         UNIT/ML-% ophthalmic solution            (J40) Bronchitis  Comment:   Plan: azithromycin (ZITHROMAX) 250 MG tablet              CHIEF COMPLAINT    Cough.  Eye redness and drainage.      HISTORY    Demetra has had a cough for about 2 weeks.  She feels like there is phlegm present but it is difficult to bring it up.  She has not had fever or shortness of breath.    Also in the last 4 days, patient has had some redness of her eyes as well as some mattering.  Eyes are not painful.  Vision is okay.    She has no history of chronic respiratory problems.  She is not a smoker.      REVIEW OF SYSTEMS    No fevers or chills.  No chest pains.  No nausea or abdominal pain.  No edema.      EXAM  /80   Pulse 112   Temp 97.8  F (36.6  C)   Resp 18   Wt 83.3 kg (183 lb 11.2 oz)   SpO2 96%   Breastfeeding No   BMI 31.53 kg/m      NAD.  Conjunctival redness and slight mattering right greater than left.  Pupils equal.  Pharynx unremarkable.  No cervical adenopathy.  Lungs occasional rhonchi.

## 2022-08-18 NOTE — NURSING NOTE
Pt presents to clinic today for bilateral eye drainage/ crusting  that started Sunday and ongoing cough for 2 weeks.      Medication Reconciliation: complete  Nasreen Hallman LPN

## 2023-02-01 ENCOUNTER — APPOINTMENT (OUTPATIENT)
Dept: CT IMAGING | Facility: OTHER | Age: 68
End: 2023-02-01
Attending: FAMILY MEDICINE
Payer: MEDICARE

## 2023-02-01 ENCOUNTER — NURSE TRIAGE (OUTPATIENT)
Dept: NURSING | Facility: CLINIC | Age: 68
End: 2023-02-01
Payer: MEDICARE

## 2023-02-01 ENCOUNTER — HOSPITAL ENCOUNTER (EMERGENCY)
Facility: OTHER | Age: 68
Discharge: ANOTHER HOSPITAL | End: 2023-02-02
Attending: FAMILY MEDICINE | Admitting: FAMILY MEDICINE
Payer: MEDICARE

## 2023-02-01 DIAGNOSIS — K80.00 CALCULUS OF GALLBLADDER WITH ACUTE CHOLECYSTITIS WITHOUT OBSTRUCTION: ICD-10-CM

## 2023-02-01 DIAGNOSIS — K85.10 ACUTE BILIARY PANCREATITIS WITHOUT INFECTION OR NECROSIS: ICD-10-CM

## 2023-02-01 PROBLEM — J18.9 MULTIFOCAL PNEUMONIA: Status: ACTIVE | Noted: 2023-02-01

## 2023-02-01 LAB
ALBUMIN SERPL BCG-MCNC: 4.1 G/DL (ref 3.5–5.2)
ALP SERPL-CCNC: 136 U/L (ref 35–104)
ALT SERPL W P-5'-P-CCNC: 31 U/L (ref 10–35)
ANION GAP SERPL CALCULATED.3IONS-SCNC: 13 MMOL/L (ref 7–15)
AST SERPL W P-5'-P-CCNC: 47 U/L (ref 10–35)
BASOPHILS # BLD AUTO: 0.1 10E3/UL (ref 0–0.2)
BASOPHILS NFR BLD AUTO: 1 %
BILIRUB SERPL-MCNC: 0.2 MG/DL
BUN SERPL-MCNC: 23.8 MG/DL (ref 8–23)
CALCIUM SERPL-MCNC: 9.3 MG/DL (ref 8.8–10.2)
CHLORIDE SERPL-SCNC: 105 MMOL/L (ref 98–107)
CREAT SERPL-MCNC: 0.86 MG/DL (ref 0.51–0.95)
CRP SERPL-MCNC: 3.99 MG/L
DEPRECATED HCO3 PLAS-SCNC: 21 MMOL/L (ref 22–29)
EOSINOPHIL # BLD AUTO: 0.1 10E3/UL (ref 0–0.7)
EOSINOPHIL NFR BLD AUTO: 1 %
ERYTHROCYTE [DISTWIDTH] IN BLOOD BY AUTOMATED COUNT: 14.9 % (ref 10–15)
FLUAV RNA SPEC QL NAA+PROBE: NEGATIVE
FLUBV RNA RESP QL NAA+PROBE: NEGATIVE
GFR SERPL CREATININE-BSD FRML MDRD: 74 ML/MIN/1.73M2
GLUCOSE SERPL-MCNC: 173 MG/DL (ref 70–99)
HCT VFR BLD AUTO: 37.1 % (ref 35–47)
HGB BLD-MCNC: 11.8 G/DL (ref 11.7–15.7)
HOLD SPECIMEN: NORMAL
HOLD SPECIMEN: NORMAL
IMM GRANULOCYTES # BLD: 0 10E3/UL
IMM GRANULOCYTES NFR BLD: 0 %
LACTATE SERPL-SCNC: 1.3 MMOL/L (ref 0.7–2)
LIPASE SERPL-CCNC: >3000 U/L (ref 13–60)
LYMPHOCYTES # BLD AUTO: 1.1 10E3/UL (ref 0.8–5.3)
LYMPHOCYTES NFR BLD AUTO: 9 %
MCH RBC QN AUTO: 28.2 PG (ref 26.5–33)
MCHC RBC AUTO-ENTMCNC: 31.8 G/DL (ref 31.5–36.5)
MCV RBC AUTO: 89 FL (ref 78–100)
MONOCYTES # BLD AUTO: 0.7 10E3/UL (ref 0–1.3)
MONOCYTES NFR BLD AUTO: 6 %
NEUTROPHILS # BLD AUTO: 10.4 10E3/UL (ref 1.6–8.3)
NEUTROPHILS NFR BLD AUTO: 83 %
NRBC # BLD AUTO: 0 10E3/UL
NRBC BLD AUTO-RTO: 0 /100
PLATELET # BLD AUTO: 371 10E3/UL (ref 150–450)
POTASSIUM SERPL-SCNC: 3.6 MMOL/L (ref 3.4–5.3)
PROT SERPL-MCNC: 7.4 G/DL (ref 6.4–8.3)
RBC # BLD AUTO: 4.19 10E6/UL (ref 3.8–5.2)
RSV RNA SPEC NAA+PROBE: NEGATIVE
SARS-COV-2 RNA RESP QL NAA+PROBE: NEGATIVE
SODIUM SERPL-SCNC: 139 MMOL/L (ref 136–145)
TSH SERPL DL<=0.005 MIU/L-ACNC: 3.11 UIU/ML (ref 0.3–4.2)
WBC # BLD AUTO: 12.3 10E3/UL (ref 4–11)

## 2023-02-01 PROCEDURE — 96375 TX/PRO/DX INJ NEW DRUG ADDON: CPT | Performed by: FAMILY MEDICINE

## 2023-02-01 PROCEDURE — 250N000011 HC RX IP 250 OP 636: Performed by: FAMILY MEDICINE

## 2023-02-01 PROCEDURE — 96361 HYDRATE IV INFUSION ADD-ON: CPT | Performed by: FAMILY MEDICINE

## 2023-02-01 PROCEDURE — 258N000003 HC RX IP 258 OP 636: Performed by: FAMILY MEDICINE

## 2023-02-01 PROCEDURE — 87637 SARSCOV2&INF A&B&RSV AMP PRB: CPT | Performed by: FAMILY MEDICINE

## 2023-02-01 PROCEDURE — 80053 COMPREHEN METABOLIC PANEL: CPT | Performed by: FAMILY MEDICINE

## 2023-02-01 PROCEDURE — 96374 THER/PROPH/DIAG INJ IV PUSH: CPT | Performed by: FAMILY MEDICINE

## 2023-02-01 PROCEDURE — 83605 ASSAY OF LACTIC ACID: CPT | Performed by: FAMILY MEDICINE

## 2023-02-01 PROCEDURE — 120N000001 HC R&B MED SURG/OB

## 2023-02-01 PROCEDURE — 86140 C-REACTIVE PROTEIN: CPT | Performed by: FAMILY MEDICINE

## 2023-02-01 PROCEDURE — 99285 EMERGENCY DEPT VISIT HI MDM: CPT | Mod: 25,CS | Performed by: FAMILY MEDICINE

## 2023-02-01 PROCEDURE — 74177 CT ABD & PELVIS W/CONTRAST: CPT | Mod: TC,MG

## 2023-02-01 PROCEDURE — 84443 ASSAY THYROID STIM HORMONE: CPT | Performed by: FAMILY MEDICINE

## 2023-02-01 PROCEDURE — 83690 ASSAY OF LIPASE: CPT | Performed by: FAMILY MEDICINE

## 2023-02-01 PROCEDURE — C9803 HOPD COVID-19 SPEC COLLECT: HCPCS | Performed by: FAMILY MEDICINE

## 2023-02-01 PROCEDURE — 36415 COLL VENOUS BLD VENIPUNCTURE: CPT | Performed by: FAMILY MEDICINE

## 2023-02-01 PROCEDURE — 85025 COMPLETE CBC W/AUTO DIFF WBC: CPT | Performed by: FAMILY MEDICINE

## 2023-02-01 PROCEDURE — 99285 EMERGENCY DEPT VISIT HI MDM: CPT | Mod: CS | Performed by: FAMILY MEDICINE

## 2023-02-01 RX ORDER — IOPAMIDOL 755 MG/ML
100 INJECTION, SOLUTION INTRAVASCULAR ONCE
Status: COMPLETED | OUTPATIENT
Start: 2023-02-01 | End: 2023-02-01

## 2023-02-01 RX ORDER — ONDANSETRON 2 MG/ML
4 INJECTION INTRAMUSCULAR; INTRAVENOUS
Status: DISCONTINUED | OUTPATIENT
Start: 2023-02-01 | End: 2023-02-02 | Stop reason: HOSPADM

## 2023-02-01 RX ORDER — KETOROLAC TROMETHAMINE 15 MG/ML
15 INJECTION, SOLUTION INTRAMUSCULAR; INTRAVENOUS ONCE
Status: COMPLETED | OUTPATIENT
Start: 2023-02-01 | End: 2023-02-01

## 2023-02-01 RX ADMIN — SODIUM CHLORIDE 1000 ML: 9 INJECTION, SOLUTION INTRAVENOUS at 22:45

## 2023-02-01 RX ADMIN — ONDANSETRON 4 MG: 2 INJECTION INTRAMUSCULAR; INTRAVENOUS at 22:23

## 2023-02-01 RX ADMIN — KETOROLAC TROMETHAMINE 15 MG: 15 INJECTION, SOLUTION INTRAMUSCULAR; INTRAVENOUS at 22:24

## 2023-02-01 RX ADMIN — IOPAMIDOL 100 ML: 755 INJECTION, SOLUTION INTRAVENOUS at 23:50

## 2023-02-01 RX ADMIN — HYDROMORPHONE HYDROCHLORIDE 1 MG: 1 INJECTION, SOLUTION INTRAMUSCULAR; INTRAVENOUS; SUBCUTANEOUS at 22:24

## 2023-02-01 ASSESSMENT — ACTIVITIES OF DAILY LIVING (ADL): ADLS_ACUITY_SCORE: 35

## 2023-02-02 VITALS
SYSTOLIC BLOOD PRESSURE: 135 MMHG | RESPIRATION RATE: 10 BRPM | HEART RATE: 94 BPM | DIASTOLIC BLOOD PRESSURE: 91 MMHG | OXYGEN SATURATION: 96 % | TEMPERATURE: 96 F

## 2023-02-02 LAB
ALBUMIN UR-MCNC: 20 MG/DL
APPEARANCE UR: ABNORMAL
BILIRUB UR QL STRIP: NEGATIVE
COLOR UR AUTO: YELLOW
GLUCOSE UR STRIP-MCNC: NEGATIVE MG/DL
HGB UR QL STRIP: NEGATIVE
KETONES UR STRIP-MCNC: NEGATIVE MG/DL
LEUKOCYTE ESTERASE UR QL STRIP: ABNORMAL
MUCOUS THREADS #/AREA URNS LPF: PRESENT /LPF
NITRATE UR QL: POSITIVE
PH UR STRIP: 7.5 [PH] (ref 5–9)
RBC URINE: 3 /HPF
SP GR UR STRIP: 1.04 (ref 1–1.03)
SQUAMOUS EPITHELIAL: 18 /HPF
UROBILINOGEN UR STRIP-MCNC: NORMAL MG/DL
WBC URINE: 11 /HPF
YEAST #/AREA URNS HPF: ABNORMAL /HPF

## 2023-02-02 PROCEDURE — 96376 TX/PRO/DX INJ SAME DRUG ADON: CPT | Performed by: FAMILY MEDICINE

## 2023-02-02 PROCEDURE — 250N000011 HC RX IP 250 OP 636: Performed by: FAMILY MEDICINE

## 2023-02-02 PROCEDURE — 81001 URINALYSIS AUTO W/SCOPE: CPT | Performed by: FAMILY MEDICINE

## 2023-02-02 PROCEDURE — 87086 URINE CULTURE/COLONY COUNT: CPT | Performed by: FAMILY MEDICINE

## 2023-02-02 PROCEDURE — 96367 TX/PROPH/DG ADDL SEQ IV INF: CPT | Performed by: FAMILY MEDICINE

## 2023-02-02 PROCEDURE — 120N000001 HC R&B MED SURG/OB

## 2023-02-02 RX ADMIN — HYDROMORPHONE HYDROCHLORIDE 1 MG: 1 INJECTION, SOLUTION INTRAMUSCULAR; INTRAVENOUS; SUBCUTANEOUS at 00:20

## 2023-02-02 RX ADMIN — TAZOBACTAM SODIUM AND PIPERACILLIN SODIUM 3.38 G: 375; 3 INJECTION, SOLUTION INTRAVENOUS at 01:02

## 2023-02-02 ASSESSMENT — ENCOUNTER SYMPTOMS
COUGH: 0
SHORTNESS OF BREATH: 0
DIAPHORESIS: 1
VOMITING: 0
DIARRHEA: 1
ABDOMINAL PAIN: 1
FEVER: 0
NAUSEA: 1
BACK PAIN: 1

## 2023-02-02 ASSESSMENT — ACTIVITIES OF DAILY LIVING (ADL): ADLS_ACUITY_SCORE: 35

## 2023-02-02 NOTE — ED PROVIDER NOTES
History     Chief Complaint   Patient presents with     Abdominal Pain     Back Pain     Patient states that she had burger [employer] this evening and became nauseous, started having diarrhea, severe abdominal and back pain in the middle of her back.     The history is provided by the patient and medical records.     Demetra Chang is a 67 year old female here with abdominal pain, back pain, N, D. No vomiting. She had some diarrhea, no blood, earlier today and then the other symptoms started. She thought this was from food poisoning from [employer] that she ate at about 5:30 PM when her other symptoms started. She has no cough or SOB. She felt hot and sweaty at home.     She has a history of mental health issues including bipolar, anxiety, major depression (on Prozac, Effexor), narcolepsy (on Adderall), HTN with urine issues (on Cardura), overactive thyroid (on methimazole).    Allergies:  No Known Allergies    Problem List:    Patient Active Problem List    Diagnosis Date Noted     Multifocal pneumonia 02/01/2023     Priority: Medium     Personal history of other mental disorder 03/09/2018     Priority: Medium     HPV (human papilloma virus) infection 04/04/2017     Priority: Medium     Overview:   4/17; cotesting in yr       Visit for suture removal 09/08/2016     Priority: Medium     Skin lesion 09/01/2016     Priority: Medium     Seborrheic keratosis 08/26/2016     Priority: Medium     Migraine without aura and without status migrainosus, not intractable 04/28/2016     Priority: Medium     Osteopenia, senile 02/09/2016     Priority: Medium     Overview:   dexa 2013, slightly worse Dexa 9/4/18       Anxiety 02/03/2016     Priority: Medium     Moderate episode of recurrent major depressive disorder (H) 02/03/2016     Priority: Medium     Narcolepsy 09/02/2014     Priority: Medium     Detrusor sphincter dyssynergia 08/27/2013     Priority: Medium     H/O: duodenal ulcer 12/13/2012     Priority: Medium     Overview:    2005 seen on EGD       Incomplete bladder emptying 12/13/2012     Priority: Medium     Overview:   Detrusor sphincter dyssnergia. 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     Priority: Medium     Overview:   IMO Update 10/11          Past Medical History:    Past Medical History:   Diagnosis Date     Anxiety 2/3/2016     Bipolar affective disorder (H) 9/20/2011     Detrusor sphincter dyssynergia 8/27/2013     Disorder of upper respiratory system 2/9/2012     H/O: duodenal ulcer 12/13/2012     HPV (human papilloma virus) infection 4/4/2017     Migraine without aura and without status migrainosus, not intractable 4/28/2016     Moderate episode of recurrent major depressive disorder (H) 2/3/2016     Osteopenia, senile 2/9/2016     Pain in joint, ankle and foot 5/12/2010     Personal history of other diseases of the digestive system (CODE)      Personal history of other medical treatment (CODE)      Seborrheic keratosis 8/26/2016       Past Surgical History:    Past Surgical History:   Procedure Laterality Date     CLOSED REDUCTION ANKLE Right 1999     ORIF with hardware removal 2014.     COLONOSCOPY  11/16/2016    Follow up in 10 years per Dr[facility] Cos[facility]     ENDOSCOPIC SINUS SURGERY  1990 1990s       Family History:    Family History   Problem Relation Age of Onset     Other - See Comments Mother         Lung cancer     Other - See Comments Father         Cancer - ? GI     Family History Negative Sister         Good Health     Family History Negative Sister         Good Health       Social History:  Marital Status:   [5]  Social History     Tobacco Use     Smoking status: Never     Smokeless tobacco: Never    Vaping Use     Vaping Use: Never used   Substance Use Topics     Alcohol use: No     Alcohol/week: 0.0 standard drinks     Drug use: Never     Comment: Drug use: No        Medications:    amphetamine-dextroamphetamine (ADDERALL XR) 30 MG 24 hr capsule  ARIPiprazole (ABILIFY) 10 MG tablet  aspirin 81 MG tablet  Calcium Carbonate (CALCIUM 600 PO)  Cholecalciferol (VITAMIN D3) 1000 UNITS CAPS  cyanocobalamin (VITAMIN  B-12) 1000 MCG tablet  doxazosin (CARDURA) 1 MG tablet  FLUoxetine (PROZAC) 40 MG capsule  methimazole (TAPAZOLE) 5 MG tablet  Multiple Vitamin (MULTI-VITAMIN PO)  pantoprazole (PROTONIX) 40 MG EC tablet  topiramate (TOPAMAX) 100 MG tablet  trimethoprim-polymyxin b (POLYTRIM) 05850-8.1 UNIT/ML-% ophthalmic solution  venlafaxine (EFFEXOR XR) 150 MG 24 hr capsule  venlafaxine (EFFEXOR-XR) 75 MG 24 hr capsule      Review of Systems   Constitutional: Positive for diaphoresis. Negative for fever.   Respiratory: Negative for cough and shortness of breath.    Gastrointestinal: Positive for abdominal pain, diarrhea and nausea. Negative for vomiting.   Musculoskeletal: Positive for back pain.   All other systems reviewed and are negative.      Physical Exam   BP: (!) 153/93  Pulse: 75  Temp: (!) 94.5  F (34.7  C)  Resp: 16  SpO2: 100 %      Physical Exam  Vitals and nursing note reviewed.   Constitutional:       General: She is not in acute distress.     Appearance: She is well-developed. She is ill-appearing and diaphoretic. She is not toxic-appearing.   Cardiovascular:      Rate and Rhythm: Normal rate and regular rhythm.      Heart sounds: Normal heart sounds.   Pulmonary:      Effort: Pulmonary effort is normal. No respiratory distress.      Breath sounds: Normal breath sounds.   Abdominal:      General: Abdomen is protuberant. Bowel sounds are normal.      Palpations: Abdomen is soft.      Tenderness: There is generalized abdominal tenderness.   Skin:     Comments: Cool to touch   Neurological:       General: No focal deficit present.      Mental Status: She is alert and oriented to person, place, and time.   Psychiatric:         Mood and Affect: Mood normal.         Results for orders placed or performed during the hospital encounter of 02/01/23 (from the past 24 hour(s))   CBC with platelets differential    Narrative    The following orders were created for panel order CBC with platelets differential.  Procedure                               Abnormality         Status                     ---------                               -----------         ------                     CBC with platelets and d...[[id number]]  Abnormal            Final result                 Please view results for these tests on the individual orders.   Comprehensive metabolic panel   Result Value Ref Range    Sodium 139 136 - 145 mmol/L    Potassium 3.6 3.4 - 5.3 mmol/L    Chloride 105 98 - 107 mmol/L    Carbon Dioxide (CO2) 21 (L) 22 - 29 mmol/L    Anion Gap 13 7 - 15 mmol/L    Urea Nitrogen 23.8 (H) 8.0 - 23.0 mg/dL    Creatinine 0.86 0.51 - 0.95 mg/dL    Calcium 9.3 8.8 - 10.2 mg/dL    Glucose 173 (H) 70 - 99 mg/dL    Alkaline Phosphatase 136 (H) 35 - 104 U/L    AST 47 (H) 10 - 35 U/L    ALT 31 10 - 35 U/L    Protein Total 7.4 6.4 - 8.3 g/dL    Albumin 4.1 3.5 - 5.2 g/dL    Bilirubin Total 0.2 <=1.2 mg/dL    GFR Estimate 74 >60 mL/min/1.73m2   Lipase   Result Value Ref Range    Lipase >3,000 (H) 13 - 60 U/L   Lactic acid whole blood   Result Value Ref Range    Lactic Acid 1.3 0.7 - 2.0 mmol/L   CRP inflammation   Result Value Ref Range    CRP Inflammation 3.99 <5.00 mg/L   TSH Reflex GH   Result Value Ref Range    TSH 3.11 0.30 - 4.20 uIU/mL   CBC with platelets and differential   Result Value Ref Range    WBC Count 12.3 (H) 4.0 - 11.0 10e3/uL    RBC Count 4.19 3.80 - 5.20 10e6/uL    Hemoglobin 11.8 11.7 - 15.7 g/dL    Hematocrit 37.1 35.0 - 47.0 %    MCV 89 78 - 100 fL    MCH 28.2 26.5 - 33.0 pg    MCHC 31.8 31.5 - 36.5 g/dL    RDW  14.9 10.0 - 15.0 %    Platelet Count 371 150 - 450 10e3/uL    % Neutrophils 83 %    % Lymphocytes 9 %    % Monocytes 6 %    % Eosinophils 1 %    % Basophils 1 %    % Immature Granulocytes 0 %    NRBCs per 100 WBC 0 <1 /100    Absolute Neutrophils 10.4 (H) 1.6 - 8.3 10e3/uL    Absolute Lymphocytes 1.1 0.8 - 5.3 10e3/uL    Absolute Monocytes 0.7 0.0 - 1.3 10e3/uL    Absolute Eosinophils 0.1 0.0 - 0.7 10e3/uL    Absolute Basophils 0.1 0.0 - 0.2 10e3/uL    Absolute Immature Granulocytes 0.0 <=0.4 10e3/uL    Absolute NRBCs 0.0 10e3/uL   Extra Tube    Narrative    The following orders were created for panel order Extra Tube.  Procedure                               Abnormality         Status                     ---------                               -----------         ------                     Extra Blue Top Tube[[id number]]                              Final result                 Please view results for these tests on the individual orders.   Extra Blue Top Tube   Result Value Ref Range    Hold Specimen x    Extra Tube    Narrative    The following orders were created for panel order Extra Tube.  Procedure                               Abnormality         Status                     ---------                               -----------         ------                     Extra Red Top Tube[[id number]]                               Final result                 Please view results for these tests on the individual orders.   Extra Red Top Tube   Result Value Ref Range    Hold Specimen x    Symptomatic Influenza A/B & SARS-CoV2 (COVID-19) Virus PCR Multiplex Nasopharyngeal    Specimen: Nasopharyngeal; Swab   Result Value Ref Range    Influenza A PCR Negative Negative    Influenza B PCR Negative Negative    RSV PCR Negative Negative    SARS CoV2 PCR Negative Negative    Narrative    Testing was performed using the Xpert Xpress CoV2/Flu/RSV Assay on the [employer] Instrument. This test should be ordered for the detection  of SARS-CoV-2 and influenza viruses in individuals who meet clinical and/or epidemiological criteria. Test performance is unknown in asymptomatic patients. This test is for in vitro diagnostic use under the FDA EUA for laboratories certified under CLIA to perform high or moderate complexity testing. This test has not been FDA cleared or approved. A negative result does not rule out the presence of PCR inhibitors in the specimen or target RNA in concentration below the limit of detection for the assay. If only one viral target is positive but coinfection with multiple targets is suspected, the sample should be re-tested with another FDA cleared, approved, or authorized test, if coinfection would change clinical management. This test was validated by the [facility]. These laboratories are certified under the Clinical Laboratory Improvement Amendments of 1988 (CLIA-88) as qualified to perform high complexity laboratory testing.   CT Abdomen Pelvis w Contrast    Narrative    PROCEDURE INFORMATION:   Exam: CT Abdomen And Pelvis With Contrast   Exam date and time: 2/1/2023 11:43 PM   Age: 67 years old   Clinical indication: Nausea and vomiting; Abdominal pain; Acute; Additional   info: Abdominal pain, pancreatitis, elevated alk phos and ast, reports no   alcohol use     TECHNIQUE:   Imaging protocol: Computed tomography of the abdomen and pelvis with contrast.   Radiation optimization: All CT scans at this facility use at least one of these   dose optimization techniques: automated exposure control; mA and/or kV   adjustment per patient size (includes targeted exams where dose is matched to   clinical indication); or iterative reconstruction.   Contrast material: ISOVUE 370; Contrast volume: 100 ml; Contrast route:   INTRAVENOUS (IV);    Other protocol: This patient has received 0 known CTs and 0 known cardiac   nuclear medicine studies in the 12 months prior to the current study.     COMPARISON:   No  relevant prior studies available.     FINDINGS:   Diaphragm: Small hiatal hernia.     Liver: Periportal edema compatible with non-specific hepatic inflammation,   likely reactive.   Gallbladder and bile ducts: Cholelithiasis with moderately distended   gallbladder and pericholecystic fluid as well as mild biliary duct dilatation.   No evidence of choledocholithiasis.   Pancreas: Mild inflammatory changes surround the pancreatic head, suspect   reactive. Pancreatic body and tail are relatively unremarkable. No pancreatic   ductal dilation.   Spleen: Unremarkable.   Adrenal glands: Unremarkable.   Kidneys and ureters: Unremarkable.   Stomach and bowel: Inflammatory changes in the gastric antrum and proximal   duodenum, likely reactive. No evidence of small bowel obstruction. Colonic   diverticulosis without evidence of acute diverticulitis. No evidence of small   bowel obstruction.   Appendix: Appendix is visualized and is normal.     Intraperitoneal space: No free fluid. No pneumoperitoneum.   Vasculature: No abdominal aortic aneurysm or dissection.   Lymph nodes: Unremarkable.    Urinary bladder: Unremarkable.   Reproductive: Unremarkable.   Bones/joints: Degenerative changes in the lumbar spine with grade 1   anterolisthesis of L4 on L5. No pars defects. No evidence of acute osseous   abnormality.   Soft tissues: Unremarkable.       Impression    IMPRESSION:   1. Cholelithiasis with evidence of acute cholecystitis and mild biliary ductal   dilatation. No evidence of choledocholithiasis, noting that MRCP is more   sensitive in evaluating for bile duct stones.   2. Colonic diverticulosis without evidence of acute diverticulitis.   3. Small hiatal hernia.   4. Additional non-acute ancillary findings are detailed above.     THIS DOCUMENT HAS BEEN ELECTRONICALLY SIGNED BY MD LONG FRANCO with Microscopic reflex to Culture    Specimen: Urine, Midstream   Result Value Ref Range    Color Urine Yellow Colorless,  Straw, Light Yellow, Yellow    Appearance Urine Slightly Cloudy (A) Clear    Glucose Urine Negative Negative mg/dL    Bilirubin Urine Negative Negative    Ketones Urine Negative Negative mg/dL    Specific Gravity Urine 1.039 (H) 1.000 - 1.030    Blood Urine Negative Negative    pH Urine 7.5 5.0 - 9.0    Protein Albumin Urine 20 (A) Negative mg/dL    Urobilinogen Urine Normal Normal, 2.0 mg/dL    Nitrite Urine Positive (A) Negative    Leukocyte Esterase Urine Small (A) Negative    Budding Yeast Urine Few (A) None Seen /HPF    Mucus Urine Present (A) None Seen /LPF    RBC Urine 3 (H) <=2 /HPF    WBC Urine 11 (H) <=5 /HPF    Squamous Epithelials Urine 18 (H) <=1 /HPF    Narrative    Urine Culture ordered based on laboratory criteria       Medications   HYDROmorphone (DILAUDID) injection 1 mg (1 mg Intravenous Given 2/2/23 0020)   ondansetron (ZOFRAN) injection 4 mg (4 mg Intravenous Given 2/1/23 2223)   piperacillin-tazobactam (ZOSYN) infusion 3.375 g (has no administration in time range)   ketorolac (TORADOL) injection 15 mg (15 mg Intravenous Given 2/1/23 2224)   0.9% sodium chloride BOLUS (0 mLs Intravenous Stopped 2/1/23 2349)   iopamidol (ISOVUE-370) solution 100 mL (100 mLs Intravenous Given 2/1/23 2350)       Assessments & Plan (with Medical Decision Making)  Demetra Chang is a 67 year old female here with abdominal pain, back pain, N, D. No vomiting. She had some diarrhea, no blood, earlier today and then the other symptoms started. She thought this was from food poisoning from [employer] that she ate at about 5:30 PM when her other symptoms started. She has no cough or SOB. She felt hot and sweaty at home.  She has a history of mental health issues including bipolar, anxiety, major depression (on Prozac, Effexor), narcolepsy (on Adderall), HTN with urine issues (on Cardura), overactive thyroid (on methimazole).  She is not on blood thinners.  No history of abdominal surgery. Her last colonoscopy was about  two years ago and had some polyps which were benign.  She has been NPO since 5:30 PM.  VS on room air /82   Pulse 91   Temp (!) 94.9  F (34.9  C)   Resp 11   SpO2 96%   She looks ill and has clammy skin. Heart and lungs sound normal.  She has generalized upper abdominal pain and tenderness with palpation, with decreased bowel sounds. We started an IV and gave meds and IV fluids. Labs show CBC with WBC 12,300, CMP with BUN 23.8, alk phos 136, AST 47, lipase >3,000, TSH normal, lactic acid normal, CRP 4, UA is a dirty catch with UC pending, 4 Plex negative.  CT abdomen shows cholelithiasis with evidence of acute cholecystitis and mild biliary ductal dilatation. No evidence of choledocholithiasis, noting that MRCP is more sensitive in evaluating for bile duct stones. I started Zosyn 3.375 mg q6 hours. I spoke with Demetra and she is feeling much better.  Demetra does not want surgery in [place]- I called [facility] and spoke with Dr Woodall, general surgeon, about this case.  They will take her to a hospital bed, get the pancreatitis to calm down and then take her to the OR.      I have reviewed the nursing notes.    I have reviewed the findings, diagnosis, plan and need for follow up with the patient.       Medical Decision Making  The patient's presentation is strongly suggestive of an acute health issue posing potential threat to life or bodily function.    The patient's evaluation involved:  an assessment requiring an independent historian (see separate area of note for details)  ordering and/or review of 3+ test(s) in this encounter (see separate area of note for details)  discussion of management or test interpretation with another health professional (Dr Woodall, General Surgeon at [facility])    The patient's management involved a decision regarding hospitalization.      Final diagnoses:   Calculus of gallbladder with acute cholecystitis without obstruction   Acute biliary pancreatitis without  infection or necrosis       2/1/2023   [facility], Olman Trinh MD  02/02/23 005

## 2023-02-02 NOTE — PROGRESS NOTES
Patient transferred to [facility] bed 2 at [place] via [facility]. Patient was stable the time of discharge. Nurse to nurse given to Maddie OLIVARES at the time of departure.

## 2023-02-02 NOTE — ED TRIAGE NOTES
Patient states that she had burger [employer] this evening and became nauseous, started having diarrhea, severe abdominal and back pain in the middle of her back.

## 2023-02-02 NOTE — PROGRESS NOTES
Patient is hypothermic 94.5F but states that she feels like she is burning up. She declines warming therapy at this time.

## 2023-02-02 NOTE — TELEPHONE ENCOUNTER
"Patient calling reports having severe abdominal and back pain since eating [employer] at 5 pm. Reports diarrhea, vomiting and sweating as well with abdominal pain and back pain the worst for the patient. Denies shock, losing consciousness and confusion. Advised per protocol to be seen in the ER with patient agreeable. Reports she will go to [place] ED now.     Luz Simpson RN 02/01/23 9:11 PM    Health Triage Nurse Advisor      Reason for Disposition    [1] SEVERE pain (e.g., excruciating) AND [2] present > 1 hour    Additional Information    Negative: Shock suspected (e.g., cold/pale/clammy skin, too weak to stand, low BP, rapid pulse)    Negative: Difficult to awaken or acting confused (e.g., disoriented, slurred speech)    Negative: Passed out (i.e., lost consciousness, collapsed and was not responding)    Negative: Sounds like a life-threatening emergency to the triager    Negative: Chest pain    Negative: Pain is mainly in upper abdomen  (if needed ask: \"is it mainly above the belly button?\")    Negative: Followed an abdomen (stomach) injury    Negative: [1] Abdominal pain AND [2] pregnant < 20 weeks    Negative: [1] Abdominal pain AND [2] pregnant 20 or more weeks    Negative: [1] Abdominal pain AND [2] postpartum (from 0 to 6 weeks after delivery)    Protocols used: ABDOMINAL PAIN - FEMALE-A-AH      "

## 2023-02-04 LAB — BACTERIA UR CULT: ABNORMAL

## 2024-05-02 ENCOUNTER — HOSPITAL ENCOUNTER (OUTPATIENT)
Dept: GENERAL RADIOLOGY | Facility: OTHER | Age: 69
Discharge: HOME | End: 2024-05-02
Payer: MEDICARE

## 2024-05-02 ENCOUNTER — OFFICE VISIT (OUTPATIENT)
Dept: FAMILY MEDICINE | Facility: OTHER | Age: 69
End: 2024-05-02
Attending: NURSE PRACTITIONER
Payer: MEDICARE

## 2024-05-02 VITALS
RESPIRATION RATE: 24 BRPM | HEIGHT: 65 IN | OXYGEN SATURATION: 95 % | WEIGHT: 193.44 LBS | SYSTOLIC BLOOD PRESSURE: 132 MMHG | HEART RATE: 99 BPM | DIASTOLIC BLOOD PRESSURE: 80 MMHG | TEMPERATURE: 100 F | BODY MASS INDEX: 32.23 KG/M2

## 2024-05-02 DIAGNOSIS — R50.9 FEVER, UNSPECIFIED FEVER CAUSE: ICD-10-CM

## 2024-05-02 DIAGNOSIS — J02.9 SORE THROAT: ICD-10-CM

## 2024-05-02 DIAGNOSIS — J06.9 VIRAL URI WITH COUGH: Primary | ICD-10-CM

## 2024-05-02 DIAGNOSIS — F33.1 MODERATE EPISODE OF RECURRENT MAJOR DEPRESSIVE DISORDER (H): ICD-10-CM

## 2024-05-02 DIAGNOSIS — R05.1 ACUTE COUGH: ICD-10-CM

## 2024-05-02 DIAGNOSIS — R09.81 CONGESTION OF PARANASAL SINUS: ICD-10-CM

## 2024-05-02 DIAGNOSIS — R11.0 NAUSEA: ICD-10-CM

## 2024-05-02 DIAGNOSIS — R19.7 DIARRHEA, UNSPECIFIED TYPE: ICD-10-CM

## 2024-05-02 DIAGNOSIS — R53.83 FATIGUE, UNSPECIFIED TYPE: ICD-10-CM

## 2024-05-02 LAB
ANION GAP SERPL CALCULATED.3IONS-SCNC: 11 MMOL/L (ref 7–15)
BASOPHILS # BLD AUTO: 0 10E3/UL (ref 0–0.2)
BASOPHILS NFR BLD AUTO: 1 %
BUN SERPL-MCNC: 19.4 MG/DL (ref 8–23)
CALCIUM SERPL-MCNC: 9.3 MG/DL (ref 8.8–10.2)
CHLORIDE SERPL-SCNC: 103 MMOL/L (ref 98–107)
CREAT SERPL-MCNC: 1.02 MG/DL (ref 0.51–0.95)
DEPRECATED HCO3 PLAS-SCNC: 22 MMOL/L (ref 22–29)
EGFRCR SERPLBLD CKD-EPI 2021: 60 ML/MIN/1.73M2
EOSINOPHIL # BLD AUTO: 0.1 10E3/UL (ref 0–0.7)
EOSINOPHIL NFR BLD AUTO: 1 %
ERYTHROCYTE [DISTWIDTH] IN BLOOD BY AUTOMATED COUNT: 12.5 % (ref 10–15)
GLUCOSE SERPL-MCNC: 94 MG/DL (ref 70–99)
HCT VFR BLD AUTO: 41.9 % (ref 35–47)
HGB BLD-MCNC: 13.5 G/DL (ref 11.7–15.7)
IMM GRANULOCYTES # BLD: 0 10E3/UL
IMM GRANULOCYTES NFR BLD: 0 %
LYMPHOCYTES # BLD AUTO: 0.7 10E3/UL (ref 0.8–5.3)
LYMPHOCYTES NFR BLD AUTO: 11 %
MCH RBC QN AUTO: 31.8 PG (ref 26.5–33)
MCHC RBC AUTO-ENTMCNC: 32.2 G/DL (ref 31.5–36.5)
MCV RBC AUTO: 99 FL (ref 78–100)
MONOCYTES # BLD AUTO: 0.6 10E3/UL (ref 0–1.3)
MONOCYTES NFR BLD AUTO: 9 %
NEUTROPHILS # BLD AUTO: 4.7 10E3/UL (ref 1.6–8.3)
NEUTROPHILS NFR BLD AUTO: 78 %
NRBC # BLD AUTO: 0 10E3/UL
NRBC BLD AUTO-RTO: 0 /100
PLATELET # BLD AUTO: 268 10E3/UL (ref 150–450)
POTASSIUM SERPL-SCNC: 4.4 MMOL/L (ref 3.4–5.3)
RBC # BLD AUTO: 4.25 10E6/UL (ref 3.8–5.2)
SARS-COV-2 RNA RESP QL NAA+PROBE: NEGATIVE
SODIUM SERPL-SCNC: 136 MMOL/L (ref 135–145)
WBC # BLD AUTO: 6 10E3/UL (ref 4–11)

## 2024-05-02 PROCEDURE — 99204 OFFICE O/P NEW MOD 45 MIN: CPT

## 2024-05-02 PROCEDURE — G0463 HOSPITAL OUTPT CLINIC VISIT: HCPCS | Mod: 25

## 2024-05-02 PROCEDURE — 36415 COLL VENOUS BLD VENIPUNCTURE: CPT | Mod: ZL

## 2024-05-02 PROCEDURE — 85025 COMPLETE CBC W/AUTO DIFF WBC: CPT | Mod: ZL

## 2024-05-02 PROCEDURE — 71046 X-RAY EXAM CHEST 2 VIEWS: CPT

## 2024-05-02 PROCEDURE — 250N000009 HC RX 250

## 2024-05-02 PROCEDURE — 80048 BASIC METABOLIC PNL TOTAL CA: CPT | Mod: ZL

## 2024-05-02 PROCEDURE — 87635 SARS-COV-2 COVID-19 AMP PRB: CPT | Mod: ZL

## 2024-05-02 RX ORDER — DEXAMETHASONE SODIUM PHOSPHATE 4 MG/ML
10 VIAL (ML) INJECTION ONCE
Status: COMPLETED | OUTPATIENT
Start: 2024-05-02 | End: 2024-05-02

## 2024-05-02 RX ORDER — BENZONATATE 200 MG/1
200 CAPSULE ORAL 3 TIMES DAILY PRN
Qty: 42 CAPSULE | Refills: 0 | Status: SHIPPED | OUTPATIENT
Start: 2024-05-02 | End: 2024-05-16

## 2024-05-02 RX ORDER — ALBUTEROL SULFATE 90 UG/1
2 AEROSOL, METERED RESPIRATORY (INHALATION) EVERY 6 HOURS PRN
Qty: 18 G | Refills: 1 | Status: SHIPPED | OUTPATIENT
Start: 2024-05-02

## 2024-05-02 RX ORDER — DEXTROAMPHETAMINE SACCHARATE, AMPHETAMINE ASPARTATE MONOHYDRATE, DEXTROAMPHETAMINE SULFATE AND AMPHETAMINE SULFATE 3.75; 3.75; 3.75; 3.75 MG/1; MG/1; MG/1; MG/1
15 CAPSULE, EXTENDED RELEASE ORAL DAILY
COMMUNITY
Start: 2024-04-09

## 2024-05-02 RX ORDER — BACILLUS COAGULANS 1B CELL
1 CAPSULE ORAL
COMMUNITY
Start: 2024-01-15

## 2024-05-02 RX ADMIN — DEXAMETHASONE SODIUM PHOSPHATE 10 MG: 4 INJECTION, SOLUTION INTRA-ARTICULAR; INTRALESIONAL; INTRAMUSCULAR; INTRAVENOUS; SOFT TISSUE at 14:46

## 2024-05-02 ASSESSMENT — PAIN SCALES - GENERAL: PAINLEVEL: MILD PAIN (3)

## 2024-05-02 NOTE — PROGRESS NOTES
ASSESSMENT/PLAN:    I have reviewed the nursing notes.  I have reviewed the findings, diagnosis, plan and need for follow up with the patient.    1. Moderate episode of recurrent major depressive disorder (H)    - Patient states she feels stable with current antidepressant medications.  Denies SI or HI today.     2. Sore throat  3. Fever, unspecified fever cause  4. Congestion of paranasal sinus  5. Fatigue, unspecified type  6. Diarrhea, unspecified type  7. Nausea  8. Acute cough    - XR Chest 2 Views- no focal consolidation per radiologist    - CBC and Differential- unremarkable results    - Basic Metabolic Panel- Creatinine 1.02, GFR 60, remainder of chemistry is unremarkable results    - Symptomatic COVID-19 Virus (Coronavirus) by PCR Nose- Negative result    - dexAMETHasone (DECADRON) injectable solution used ORALLY 10 mg- administered in clinic today    9. Viral URI with cough    - albuterol (PROAIR HFA/PROVENTIL HFA/VENTOLIN HFA) 108 (90 Base) MCG/ACT inhaler; Inhale 2 puffs into the lungs every 6 hours as needed for shortness of breath, wheezing or cough  Dispense: 18 g; Refill: 1    - benzonatate (TESSALON) 200 MG capsule; Take 1 capsule (200 mg) by mouth 3 times daily as needed for cough  Dispense: 42 capsule; Refill: 0    - Discussed with patient that symptoms and exam are consistent with viral illness.  Discussed that symptomatic treatment is appropriate but not with antibiotics.      - Symptomatic treatment - Encouraged fluids, salt water gargles, honey (only if greater than 1 year in age due to risk of botulism), elevation, humidifier, sinus rinse/netti pot, lozenges, tea, topical vapor rub, popsicles, rest, etc     - May use over-the-counter Tylenol or ibuprofen as needed for pain or fever.    - Discussed warning signs/symptoms indicative of need to f/u    - Follow up if symptoms persist or worsen or concerns    - I explained my diagnostic considerations and recommendations to the patient, who voiced  understanding and agreement with the treatment plan. All questions were answered. We discussed potential side effects of any prescribed or recommended therapies, as well as expectations for response to treatments.    MAGALI Kirk CNP  5/2/2024  12:56 PM    HPI:    Demetra Chang is a 68 year old female who presents to [facility] today for concerns of cough, sore throat, fever, sinus drainage, fatigue, diarrhea and nausea for the past 5 days.  States unable to sleep or lay down due to constant cough.  States she hasn't slept in 5 nights.  Took mucinex last night which made it worse she states.  Denies shortness of breath, chest pain, headache, lightheadedness, dizziness, otalgia, and vomiting.    Past Medical History:   Diagnosis Date    Anxiety 2/3/2016    Bipolar affective disorder (H) 9/20/2011    Detrusor sphincter dyssynergia 8/27/2013    Disorder of upper respiratory system 2/9/2012    H/O: duodenal ulcer 12/13/2012    Overview:  2005 seen on EGD    HPV (human papilloma virus) infection 4/4/2017    Overview:  4/17; cotesting in yr    Migraine without aura and without status migrainosus, not intractable 4/28/2016    Moderate episode of recurrent major depressive disorder (H) 2/3/2016    Osteopenia, senile 2/9/2016    Overview:  dexa 2013, slightly worse Dexa 9/4/18    Pain in joint, ankle and foot 5/12/2010    Overview:  IMO Update 10/11    Personal history of other diseases of the digestive system (CODE)     PUD - Hospitalized ~ 2004    Personal history of other medical treatment (CODE)     Three    Seborrheic keratosis 8/26/2016     Past Surgical History:   Procedure Laterality Date    CLOSED REDUCTION ANKLE Right 1999     ORIF with hardware removal 2014.    COLONOSCOPY  11/16/2016    Follow up in 10 years per Dr. Cosby [facility]    ENDOSCOPIC SINUS SURGERY  1990 1990s     Social History     Tobacco Use    Smoking status: Never    Smokeless tobacco: Never   Substance Use Topics    Alcohol  use: No     Alcohol/week: 0.0 standard drinks of alcohol     Current Outpatient Medications   Medication Sig Dispense Refill    acetaminophen 500 MG CAPS Take 1,500 mg by mouth      amphetamine-dextroamphetamine (ADDERALL XR) 15 MG 24 hr capsule Take 15 mg by mouth daily      ARIPiprazole (ABILIFY) 10 MG tablet Take 10 mg by mouth      Cholecalciferol (VITAMIN D3) 1000 UNITS CAPS Take 1,000 Units by mouth daily      cyanocobalamin (VITAMIN  B-12) 1000 MCG tablet Take 500 mcg by mouth daily       doxazosin (CARDURA) 1 MG tablet Take 1 tablet (1 mg) by mouth At Bedtime 90 tablet 3    FLUoxetine (PROZAC) 40 MG capsule Take 40 mg by mouth 2 times daily      methimazole (TAPAZOLE) 5 MG tablet Take 5 mg by mouth daily      Multiple Vitamin (MULTI-VITAMIN PO) Take by mouth daily      topiramate (TOPAMAX) 100 MG tablet Take 100 mg by mouth 2 times daily       venlafaxine (EFFEXOR XR) 150 MG 24 hr capsule Take 150 mg by mouth daily Take 150 mg by mouth daily      VITRON-C  MG TABS tablet Take 1 tablet by mouth daily at 2 pm      aspirin 81 MG tablet Take 81 mg by mouth daily (Patient not taking: Reported on 8/18/2022)      Calcium Carbonate (CALCIUM 600 PO) Take 500 mg by mouth 2 times daily  (Patient not taking: Reported on 5/2/2024)      pantoprazole (PROTONIX) 40 MG EC tablet TAKE 1 TABLET BY MOUTH TWICE DAILY. DO NOT CRUSH (Patient not taking: Reported on 5/2/2024)      trimethoprim-polymyxin b (POLYTRIM) 00047-0.1 UNIT/ML-% ophthalmic solution Place 1-2 drops into both eyes every 4 hours (Patient not taking: Reported on 5/2/2024) 10 mL 0    venlafaxine (EFFEXOR-XR) 75 MG 24 hr capsule Take 1 capsule by mouth daily (Patient not taking: Reported on 5/2/2024)       No Known Allergies  Past medical history, past surgical history, current medications and allergies reviewed and accurate to the best of my knowledge.      ROS:  Refer to HPI    /80 (BP Location: Right arm, Patient Position: Sitting, Cuff Size: Adult  "Large)   Pulse 99   Temp 100  F (37.8  C) (Tympanic)   Resp 24   Ht 1.638 m (5' 4.5\")   Wt 87.7 kg (193 lb 7 oz)   SpO2 95%   BMI 32.69 kg/m      EXAM:  General Appearance: Well appearing 68 year old female, appropriate appearance for age. No acute distress   Ears: Left TM intact, translucent with bony landmarks appreciated, no erythema, no effusion, no bulging, no purulence.  Right TM intact, translucent with bony landmarks appreciated, no erythema, no effusion, no bulging, no purulence.  Left auditory canal clear.  Right auditory canal clear.  Normal external ears, non tender.  Eyes: conjunctivae normal without erythema or irritation, corneas clear, no drainage or crusting, no eyelid swelling, pupils equal   Oropharynx: moist mucous membranes, posterior pharynx with mild erythema, tonsils symmetric and 1+, + erythema, no exudates or petechiae, no post nasal drip seen, no trismus, voice clear.    Sinuses:  Sinus tenderness upon palpation of the frontal and maxillary sinuses  Nose:  Bilateral nares: no erythema, no edema, + drainage and + congestion   Neck: supple without adenopathy  Respiratory: normal chest wall and respirations.  Normal effort.  Clear to auscultation bilaterally, no wheezing, crackles or rhonchi.  No increased work of breathing.  Productive green/yellow sputum cough appreciated.  Cardiac: RRR with no murmurs  Abdomen: soft, nontender, no rigidity, no rebound tenderness or guarding, normal bowel sounds present   Musculoskeletal:  Equal movement of bilateral upper extremities.  Equal movement of bilateral lower extremities.  Normal gait.    Neuro: Alert and oriented to person, place, and time.    Psychological: normal affect, alert, oriented, and pleasant.     Labs: Xray:  Results for orders placed or performed in visit on 05/02/24   XR Chest 2 Views     Status: None    Narrative    PROCEDURE:  XR CHEST 2 VIEWS    HISTORY: Acute cough, .    COMPARISON:  None.    FINDINGS:  The " cardiomediastinal contours are normal. The trachea is midline.  No focal consolidation, effusion or pneumothorax.    No suspicious osseous lesion or subdiaphragmatic free air.      Impression    IMPRESSION:      No focal consolidation.      YUNG GUNN MD         SYSTEM ID:  [id number]   Basic Metabolic Panel     Status: Abnormal   Result Value Ref Range    Sodium 136 135 - 145 mmol/L    Potassium 4.4 3.4 - 5.3 mmol/L    Chloride 103 98 - 107 mmol/L    Carbon Dioxide (CO2) 22 22 - 29 mmol/L    Anion Gap 11 7 - 15 mmol/L    Urea Nitrogen 19.4 8.0 - 23.0 mg/dL    Creatinine 1.02 (H) 0.51 - 0.95 mg/dL    GFR Estimate 60 (L) >60 mL/min/1.73m2    Calcium 9.3 8.8 - 10.2 mg/dL    Glucose 94 70 - 99 mg/dL   Symptomatic COVID-19 Virus (Coronavirus) by PCR Nose     Status: Normal    Specimen: Nose; Swab   Result Value Ref Range    SARS CoV2 PCR Negative Negative    Narrative    Testing was performed using the Xpert Xpress SARS-CoV-2 Assay on the Cepheid Gene-Xpert Instrument Systems. Additional information about this Emergency Use Authorization (EUA) assay can be found via the Lab Guide. This test should be ordered for the detection of SARS-CoV-2 in individuals who meet SARS-CoV-2 clinical and/or epidemiological criteria as well as from individuals without symptoms or other reasons to suspect COVID-19. Test performance for asymptomatic patients has only been established in anterior nasal swab specimens. This test is for in vitro diagnostic use under the FDA EUA for laboratories certified under CLIA to perform high complexity testing. This test has not been FDA cleared or approved. A negative result does not rule out the presence of PCR inhibitors in the specimen or target RNA concentration below the limit of detection for the assay. The possibility of a false negative should be considered if the patient's recent exposure or clinical presentation suggests COVID-19. This test was validated by [facility]  [facility]. This laboratory is certified under the Clinical Laboratory Improvement Amendments (CLIA) as qualified to perform high complexity clinical laboratory testing.   CBC with platelets and differential     Status: Abnormal   Result Value Ref Range    WBC Count 6.0 4.0 - 11.0 10e3/uL    RBC Count 4.25 3.80 - 5.20 10e6/uL    Hemoglobin 13.5 11.7 - 15.7 g/dL    Hematocrit 41.9 35.0 - 47.0 %    MCV 99 78 - 100 fL    MCH 31.8 26.5 - 33.0 pg    MCHC 32.2 31.5 - 36.5 g/dL    RDW 12.5 10.0 - 15.0 %    Platelet Count 268 150 - 450 10e3/uL    % Neutrophils 78 %    % Lymphocytes 11 %    % Monocytes 9 %    % Eosinophils 1 %    % Basophils 1 %    % Immature Granulocytes 0 %    NRBCs per 100 WBC 0 <1 /100    Absolute Neutrophils 4.7 1.6 - 8.3 10e3/uL    Absolute Lymphocytes 0.7 (L) 0.8 - 5.3 10e3/uL    Absolute Monocytes 0.6 0.0 - 1.3 10e3/uL    Absolute Eosinophils 0.1 0.0 - 0.7 10e3/uL    Absolute Basophils 0.0 0.0 - 0.2 10e3/uL    Absolute Immature Granulocytes 0.0 <=0.4 10e3/uL    Absolute NRBCs 0.0 10e3/uL   CBC and Differential     Status: Abnormal    Narrative    The following orders were created for panel order CBC and Differential.  Procedure                               Abnormality         Status                     ---------                               -----------         ------                     CBC with platelets and d...[[id number]]  Abnormal            Final result                 Please view results for these tests on the individual orders.

## 2024-06-20 DIAGNOSIS — R05.1 ACUTE COUGH: ICD-10-CM

## 2024-09-18 RX ORDER — ALBUTEROL SULFATE 90 UG/1
AEROSOL, METERED RESPIRATORY (INHALATION)
Qty: 18 G | Refills: 1 | OUTPATIENT
Start: 2024-09-18

## 2025-01-30 ENCOUNTER — OFFICE VISIT (OUTPATIENT)
Dept: FAMILY MEDICINE | Facility: OTHER | Age: 70
End: 2025-01-30
Attending: STUDENT IN AN ORGANIZED HEALTH CARE EDUCATION/TRAINING PROGRAM
Payer: MEDICARE

## 2025-01-30 VITALS
TEMPERATURE: 98.5 F | RESPIRATION RATE: 16 BRPM | HEART RATE: 94 BPM | DIASTOLIC BLOOD PRESSURE: 80 MMHG | BODY MASS INDEX: 31.32 KG/M2 | HEIGHT: 65 IN | SYSTOLIC BLOOD PRESSURE: 120 MMHG | OXYGEN SATURATION: 97 % | WEIGHT: 188 LBS

## 2025-01-30 DIAGNOSIS — J06.9 VIRAL URI WITH COUGH: Primary | ICD-10-CM

## 2025-01-30 PROCEDURE — G0463 HOSPITAL OUTPT CLINIC VISIT: HCPCS

## 2025-01-30 RX ORDER — PREDNISONE 20 MG/1
40 TABLET ORAL DAILY
Qty: 10 TABLET | Refills: 0 | Status: SHIPPED | OUTPATIENT
Start: 2025-01-30 | End: 2025-02-04

## 2025-01-30 ASSESSMENT — PAIN SCALES - GENERAL: PAINLEVEL_OUTOF10: SEVERE PAIN (10)

## 2025-01-30 ASSESSMENT — PATIENT HEALTH QUESTIONNAIRE - PHQ9
SUM OF ALL RESPONSES TO PHQ QUESTIONS 1-9: 0
SUM OF ALL RESPONSES TO PHQ QUESTIONS 1-9: 0
10. IF YOU CHECKED OFF ANY PROBLEMS, HOW DIFFICULT HAVE THESE PROBLEMS MADE IT FOR YOU TO DO YOUR WORK, TAKE CARE OF THINGS AT HOME, OR GET ALONG WITH OTHER PEOPLE: NOT DIFFICULT AT ALL

## 2025-01-30 NOTE — PROGRESS NOTES
ASSESSMENT/PLAN:    I have reviewed the nursing notes.  I have reviewed the findings, diagnosis, plan and need for follow up with the patient.    1. Viral URI with cough (Primary)  - predniSONE (DELTASONE) 20 MG tablet; Take 2 tablets (40 mg) by mouth daily for 5 days.  Dispense: 10 tablet; Refill: 0    Patient presents with upper respiratory symptoms.  Patient's vitals are stable and she appears nontoxic. Discussed with patient that symptoms and exam are consistent with viral illness.  Discussed that symptomatic treatment of cough is appropriate but not with antibiotics.  Will treat patient with a short course of prednisone.  Advised patient take this medication in the morning with food and avoid any NSAIDs. Discussed symptomatic treatment - Encouraged fluids, salt water gargles, honey (only if greater than 1 year in age due to risk of botulism), elevation, humidifier, sinus rinse/netti pot, lozenges, tea, topical vapor rub, popsicles, rest, etc. May use over-the-counter Tylenol or ibuprofen PRN.    Discussed warning signs/symptoms indicative of need to f/u    Follow up if symptoms persist or worsen or concerns    I explained my diagnostic considerations and recommendations to the patient, who voiced understanding and agreement with the treatment plan. All questions were answered. We discussed potential side effects of any prescribed or recommended therapies, as well as expectations for response to treatments.    MAGALI Martinez CNP  1/30/2025  3:17 PM    HPI:    Demetra Chang is a 69 year old female  who presents to [facility] today for concerns of URI symptoms    URI, x 1 week    Symptoms:  YES: +  fevers or chills. Fever, highest reported temperature: unknown  YES: +  sore throat/pharyngitis/tonsillitis.   YES: +  allergy/URI Symptoms  No muffled sounds/change in hearing  No sensation of fullness in ear(s)  No ringing in ears/tinnitus  No dizziness  YES: +  congestion (head/nasal/chest)  YES: +   cough/productive cough  No post nasal drip   YES: +  headache  No sinus pain/pressure  YES: +  myalgias  No otalgia  No rash  Activity Level Changes: Yes: fatigue  Appetite/Liquid Intake Changes: Yes: decreased  Changes to Bowel Habits: Yes: diarrhea  Changes to Bladder Habits: No  Additional Symptoms to Report: Yes: wheezing  Prior workup: No    Treatments tried: OTC Cough med, Fluids, and Rest    Site of exposure: not known.  Type of exposure: not known    Other Pertinent History: none    Allergies: NKA    PCP: Mj    Past Medical History:   Diagnosis Date    Anxiety 2/3/2016    Bipolar affective disorder (H) 9/20/2011    Detrusor sphincter dyssynergia 8/27/2013    Disorder of upper respiratory system 2/9/2012    H/O: duodenal ulcer 12/13/2012    Overview:  2005 seen on EGD    HPV (human papilloma virus) infection 4/4/2017    Overview:  4/17; cotesting in yr    Migraine without aura and without status migrainosus, not intractable 4/28/2016    Moderate episode of recurrent major depressive disorder (H) 2/3/2016    Osteopenia, senile 2/9/2016    Overview:  dexa 2013, slightly worse Dexa 9/4/18    Pain in joint, ankle and foot 5/12/2010    Overview:  IMO Update 10/11    Personal history of other diseases of the digestive system (CODE)     PUD - Hospitalized ~ 2004    Personal history of other medical treatment (CODE)     Three    Seborrheic keratosis 8/26/2016     Past Surgical History:   Procedure Laterality Date    CLOSED REDUCTION ANKLE Right 1999     ORIF with hardware removal 2014.    COLONOSCOPY  11/16/2016    Follow up in 10 years per Dr. Alea Barker [place]    ENDOSCOPIC SINUS SURGERY  1990 1990s     Social History     Tobacco Use    Smoking status: Never     Passive exposure: Current    Smokeless tobacco: Never   Substance Use Topics    Alcohol use: No     Alcohol/week: 0.0 standard drinks of alcohol     Current Outpatient Medications   Medication Sig Dispense Refill    acetaminophen 500 MG CAPS  Take 1,500 mg by mouth      albuterol (PROAIR HFA/PROVENTIL HFA/VENTOLIN HFA) 108 (90 Base) MCG/ACT inhaler Inhale 2 puffs into the lungs every 6 hours as needed for shortness of breath, wheezing or cough 18 g 1    amphetamine-dextroamphetamine (ADDERALL XR) 15 MG 24 hr capsule Take 15 mg by mouth daily      ARIPiprazole (ABILIFY) 10 MG tablet Take 10 mg by mouth      Cholecalciferol (VITAMIN D3) 1000 UNITS CAPS Take 1,000 Units by mouth daily      cyanocobalamin (VITAMIN  B-12) 1000 MCG tablet Take 500 mcg by mouth daily       doxazosin (CARDURA) 1 MG tablet Take 1 tablet (1 mg) by mouth At Bedtime 90 tablet 3    FLUoxetine (PROZAC) 40 MG capsule Take 40 mg by mouth 2 times daily      methimazole (TAPAZOLE) 5 MG tablet Take 5 mg by mouth daily      Multiple Vitamin (MULTI-VITAMIN PO) Take by mouth daily      topiramate (TOPAMAX) 100 MG tablet Take 100 mg by mouth 2 times daily       venlafaxine (EFFEXOR XR) 150 MG 24 hr capsule Take 150 mg by mouth daily Take 150 mg by mouth daily      VITRON-C  MG TABS tablet Take 1 tablet by mouth daily at 2 pm      aspirin 81 MG tablet Take 81 mg by mouth daily (Patient not taking: Reported on 1/30/2025)      Calcium Carbonate (CALCIUM 600 PO) Take 500 mg by mouth 2 times daily  (Patient not taking: Reported on 1/30/2025)      pantoprazole (PROTONIX) 40 MG EC tablet TAKE 1 TABLET BY MOUTH TWICE DAILY. DO NOT CRUSH (Patient not taking: Reported on 1/30/2025)      trimethoprim-polymyxin b (POLYTRIM) 33208-4.1 UNIT/ML-% ophthalmic solution Place 1-2 drops into both eyes every 4 hours (Patient not taking: Reported on 1/30/2025) 10 mL 0    venlafaxine (EFFEXOR-XR) 75 MG 24 hr capsule Take 1 capsule by mouth daily (Patient not taking: Reported on 1/30/2025)       No Known Allergies  Past medical history, past surgical history, current medications and allergies reviewed and accurate to the best of my knowledge.      ROS:  Refer to HPI    /80 (BP Location: Left arm, Patient  "Position: Chair, Cuff Size: Adult Large)   Pulse 94   Temp 98.5  F (36.9  C) (Tympanic)   Resp 16   Ht 1.638 m (5' 4.5\")   Wt 85.3 kg (188 lb)   SpO2 97%   Breastfeeding No   BMI 31.77 kg/m      EXAM:  General Appearance: Well appearing 69 year old female, appropriate appearance for age. No acute distress   Ears: Left TM intact, translucent with bony landmarks appreciated, no erythema, no effusion, no bulging, no purulence.  Right TM intact, translucent with bony landmarks appreciated, no erythema, no effusion, no bulging, no purulence.  Left auditory canal clear.  Right auditory canal clear.  Normal external ears, non tender.  Eyes: conjunctivae normal without erythema or irritation, corneas clear, no drainage or crusting, no eyelid swelling, pupils equal   Oropharynx: moist mucous membranes, posterior pharynx without erythema, tonsils symmetric and 1+, no erythema, no exudates or petechiae, no post nasal drip seen, no trismus, voice clear.    Nose:  Bilateral nares: no erythema, no edema, no drainage or congestion   Neck: supple without adenopathy  Respiratory: normal chest wall and respirations.  Normal effort.  Clear to auscultation bilaterally, no wheezing, crackles or rhonchi.  No increased work of breathing.  Mild cough appreciated.  Cardiac: RRR with no murmurs  Musculoskeletal:  Equal movement of bilateral upper extremities.  Equal movement of bilateral lower extremities.  Normal gait.    Dermatological: no rashes noted of exposed skin  Neuro: Alert and oriented to person, place, and time.    Psychological: normal affect, alert, oriented, and pleasant.       "

## 2025-01-30 NOTE — NURSING NOTE
"Chief Complaint   Patient presents with    Cough     Cough, Fever, Chills, Lethargic x 1 Week        Initial /80 (BP Location: Left arm, Patient Position: Chair, Cuff Size: Adult Large)   Pulse 94   Temp 98.5  F (36.9  C) (Tympanic)   Resp 16   Ht 1.638 m (5' 4.5\")   Wt 85.3 kg (188 lb)   SpO2 97%   Breastfeeding No   BMI 31.77 kg/m   Estimated body mass index is 31.77 kg/m  as calculated from the following:    Height as of this encounter: 1.638 m (5' 4.5\").    Weight as of this encounter: 85.3 kg (188 lb).      Medication Reconciliation: Complete.       Jennifer Costello LPN on 1/30/2025 at 3:14 PM     "

## (undated) RX ORDER — DEXAMETHASONE SODIUM PHOSPHATE 4 MG/ML
INJECTION, SOLUTION INTRA-ARTICULAR; INTRALESIONAL; INTRAMUSCULAR; INTRAVENOUS; SOFT TISSUE
Status: DISPENSED
Start: 2024-05-02

## (undated) RX ORDER — KETOROLAC TROMETHAMINE 15 MG/ML
INJECTION, SOLUTION INTRAMUSCULAR; INTRAVENOUS
Status: DISPENSED
Start: 2023-02-01

## (undated) RX ORDER — HYDROCODONE BITARTRATE AND ACETAMINOPHEN 5; 325 MG/1; MG/1
TABLET ORAL
Status: DISPENSED
Start: 2020-02-17

## (undated) RX ORDER — ONDANSETRON 2 MG/ML
INJECTION INTRAMUSCULAR; INTRAVENOUS
Status: DISPENSED
Start: 2023-02-01